# Patient Record
Sex: FEMALE | Race: WHITE | URBAN - METROPOLITAN AREA
[De-identification: names, ages, dates, MRNs, and addresses within clinical notes are randomized per-mention and may not be internally consistent; named-entity substitution may affect disease eponyms.]

---

## 2020-02-13 ENCOUNTER — HOSPITAL ENCOUNTER (OUTPATIENT)
Age: 45
Setting detail: SPECIMEN
Discharge: HOME | End: 2020-02-13

## 2020-02-13 LAB
DIRECT EXAM: NORMAL
Lab: NORMAL
SPECIMEN DESCRIPTION: NORMAL

## 2020-02-16 LAB
CHLAMYDIA BY THIN PREP: NEGATIVE
N. GONORRHOEAE DNA, THIN PREP: NEGATIVE
SPECIMEN DESCRIPTION: NORMAL

## 2020-02-18 LAB
HPV SAMPLE: NORMAL
HPV, GENOTYPE 16: NOT DETECTED
HPV, GENOTYPE 18: NOT DETECTED
HPV, HIGH RISK OTHER: NOT DETECTED
HPV, INTERPRETATION: NORMAL
SPECIMEN DESCRIPTION: NORMAL

## 2020-02-21 LAB — CYTOLOGY REPORT: NORMAL

## 2020-07-16 ENCOUNTER — HOSPITAL ENCOUNTER (OUTPATIENT)
Age: 45
Setting detail: SPECIMEN
Discharge: HOME | End: 2020-07-16
Payer: MEDICAID

## 2020-07-18 LAB
CULTURE: ABNORMAL
CULTURE: ABNORMAL
Lab: ABNORMAL
SPECIMEN DESCRIPTION: ABNORMAL

## 2020-10-14 ENCOUNTER — HOSPITAL ENCOUNTER (OUTPATIENT)
Age: 45
Setting detail: SPECIMEN
Discharge: HOME | End: 2020-10-14
Payer: MEDICAID

## 2020-10-15 LAB
ABSOLUTE EOS #: 0.17 K/UL (ref 0–0.44)
ABSOLUTE IMMATURE GRANULOCYTE: 0.06 K/UL (ref 0–0.3)
ABSOLUTE LYMPH #: 2.4 K/UL (ref 1.1–3.7)
ABSOLUTE MONO #: 0.53 K/UL (ref 0.1–1.2)
ALBUMIN SERPL-MCNC: 4.4 G/DL (ref 3.5–5.2)
ALBUMIN/GLOBULIN RATIO: 1.6 (ref 1–2.5)
ALP BLD-CCNC: 79 U/L (ref 35–104)
ALT SERPL-CCNC: 14 U/L (ref 5–33)
ANION GAP SERPL CALCULATED.3IONS-SCNC: 13 MMOL/L (ref 9–17)
AST SERPL-CCNC: 18 U/L
BASOPHILS # BLD: 1 % (ref 0–2)
BASOPHILS ABSOLUTE: 0.07 K/UL (ref 0–0.2)
BILIRUB SERPL-MCNC: 0.5 MG/DL (ref 0.3–1.2)
BUN BLDV-MCNC: 12 MG/DL (ref 6–20)
BUN/CREAT BLD: ABNORMAL (ref 9–20)
CALCIUM SERPL-MCNC: 9.4 MG/DL (ref 8.6–10.4)
CHLORIDE BLD-SCNC: 101 MMOL/L (ref 98–107)
CHOLESTEROL/HDL RATIO: 4.5
CHOLESTEROL: 218 MG/DL
CO2: 25 MMOL/L (ref 20–31)
CREAT SERPL-MCNC: 0.93 MG/DL (ref 0.5–0.9)
DIFFERENTIAL TYPE: ABNORMAL
EOSINOPHILS RELATIVE PERCENT: 2 % (ref 1–4)
GFR AFRICAN AMERICAN: >60 ML/MIN
GFR NON-AFRICAN AMERICAN: >60 ML/MIN
GFR SERPL CREATININE-BSD FRML MDRD: ABNORMAL ML/MIN/{1.73_M2}
GFR SERPL CREATININE-BSD FRML MDRD: ABNORMAL ML/MIN/{1.73_M2}
GLUCOSE BLD-MCNC: 95 MG/DL (ref 70–99)
HCT VFR BLD CALC: 45 % (ref 36.3–47.1)
HDLC SERPL-MCNC: 48 MG/DL
HEMOGLOBIN: 14.4 G/DL (ref 11.9–15.1)
IMMATURE GRANULOCYTES: 1 %
LDL CHOLESTEROL: 144 MG/DL (ref 0–130)
LYMPHOCYTES # BLD: 30 % (ref 24–43)
MCH RBC QN AUTO: 30.6 PG (ref 25.2–33.5)
MCHC RBC AUTO-ENTMCNC: 32 G/DL (ref 28.4–34.8)
MCV RBC AUTO: 95.5 FL (ref 82.6–102.9)
MONOCYTES # BLD: 7 % (ref 3–12)
NRBC AUTOMATED: 0 PER 100 WBC
PDW BLD-RTO: 13.4 % (ref 11.8–14.4)
PLATELET # BLD: 266 K/UL (ref 138–453)
PLATELET ESTIMATE: ABNORMAL
PMV BLD AUTO: 12.4 FL (ref 8.1–13.5)
POTASSIUM SERPL-SCNC: 3.6 MMOL/L (ref 3.7–5.3)
RBC # BLD: 4.71 M/UL (ref 3.95–5.11)
RBC # BLD: ABNORMAL 10*6/UL
SEG NEUTROPHILS: 59 % (ref 36–65)
SEGMENTED NEUTROPHILS ABSOLUTE COUNT: 4.66 K/UL (ref 1.5–8.1)
SODIUM BLD-SCNC: 139 MMOL/L (ref 135–144)
TOTAL PROTEIN: 7.1 G/DL (ref 6.4–8.3)
TRIGL SERPL-MCNC: 129 MG/DL
TSH SERPL DL<=0.05 MIU/L-ACNC: 0.98 MIU/L (ref 0.3–5)
VITAMIN D 25-HYDROXY: 10 NG/ML (ref 30–100)
VLDLC SERPL CALC-MCNC: ABNORMAL MG/DL (ref 1–30)
WBC # BLD: 7.9 K/UL (ref 3.5–11.3)
WBC # BLD: ABNORMAL 10*3/UL

## 2021-07-10 ENCOUNTER — HOSPITAL ENCOUNTER (EMERGENCY)
Age: 46
Discharge: HOME | End: 2021-07-10
Attending: EMERGENCY MEDICINE

## 2021-07-10 VITALS
DIASTOLIC BLOOD PRESSURE: 82 MMHG | HEART RATE: 81 BPM | SYSTOLIC BLOOD PRESSURE: 133 MMHG | OXYGEN SATURATION: 98 % | TEMPERATURE: 97.9 F | RESPIRATION RATE: 18 BRPM
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
## 2021-07-10 PROCEDURE — 99284 EMERGENCY DEPT VISIT MOD MDM: CPT

## 2021-07-10 PROCEDURE — 6360000002 HC RX W HCPCS

## 2021-07-10 PROCEDURE — 6370000000 HC RX 637 (ALT 250 FOR IP): Performed by: STUDENT IN AN ORGANIZED HEALTH CARE EDUCATION/TRAINING PROGRAM

## 2021-07-10 PROCEDURE — 90471 IMMUNIZATION ADMIN: CPT

## 2021-07-10 PROCEDURE — 2500000003 HC RX 250 WO HCPCS

## 2021-07-10 PROCEDURE — 12001 RPR S/N/AX/GEN/TRNK 2.5CM/<: CPT

## 2021-07-10 PROCEDURE — 90715 TDAP VACCINE 7 YRS/> IM: CPT

## 2021-07-10 RX ORDER — HYDROCODONE BITARTRATE AND ACETAMINOPHEN 5; 325 MG/1; MG/1
1 TABLET ORAL ONCE
Status: COMPLETED | OUTPATIENT
Start: 2021-07-10 | End: 2021-07-10

## 2021-07-10 RX ORDER — IBUPROFEN 600 MG/1
600 TABLET ORAL 4 TIMES DAILY PRN
Qty: 15 TABLET | Refills: 0 | Status: SHIPPED | OUTPATIENT
Start: 2021-07-10 | End: 2021-07-10 | Stop reason: SDUPTHER

## 2021-07-10 RX ORDER — LIDOCAINE HYDROCHLORIDE 10 MG/ML
INJECTION, SOLUTION INFILTRATION; PERINEURAL
Status: COMPLETED
Start: 2021-07-10 | End: 2021-07-10

## 2021-07-10 RX ORDER — SULFAMETHOXAZOLE AND TRIMETHOPRIM 800; 160 MG/1; MG/1
1 TABLET ORAL 2 TIMES DAILY
Qty: 20 TABLET | Refills: 0 | Status: SHIPPED | OUTPATIENT
Start: 2021-07-10 | End: 2021-07-20

## 2021-07-10 RX ORDER — BROMPHENIRAMINE MALEATE, PSEUDOEPHEDRINE HYDROCHLORIDE, AND DEXTROMETHORPHAN HYDROBROMIDE 2; 30; 10 MG/5ML; MG/5ML; MG/5ML
SYRUP ORAL
COMMUNITY
Start: 2021-04-26

## 2021-07-10 RX ORDER — ACETAMINOPHEN 500 MG
TABLET ORAL
Qty: 15 TABLET | Refills: 0 | Status: SHIPPED | OUTPATIENT
Start: 2021-07-10 | End: 2021-07-10 | Stop reason: SDUPTHER

## 2021-07-10 RX ORDER — IBUPROFEN 600 MG/1
600 TABLET ORAL 4 TIMES DAILY PRN
Qty: 15 TABLET | Refills: 0 | Status: SHIPPED | OUTPATIENT
Start: 2021-07-10

## 2021-07-10 RX ORDER — CLINDAMYCIN HYDROCHLORIDE 150 MG/1
450 CAPSULE ORAL 3 TIMES DAILY
Qty: 90 CAPSULE | Refills: 0 | Status: SHIPPED | OUTPATIENT
Start: 2021-07-10 | End: 2021-07-20

## 2021-07-10 RX ORDER — LIDOCAINE HYDROCHLORIDE 10 MG/ML
20 INJECTION, SOLUTION INFILTRATION; PERINEURAL ONCE
Status: COMPLETED | OUTPATIENT
Start: 2021-07-10 | End: 2021-07-10

## 2021-07-10 RX ORDER — ACETAMINOPHEN 500 MG
TABLET ORAL
Qty: 15 TABLET | Refills: 0 | Status: SHIPPED | OUTPATIENT
Start: 2021-07-10

## 2021-07-10 RX ADMIN — LIDOCAINE HYDROCHLORIDE 20 ML: 10 INJECTION, SOLUTION INFILTRATION; PERINEURAL at 15:23

## 2021-07-10 RX ADMIN — TETANUS TOXOID, REDUCED DIPHTHERIA TOXOID AND ACELLULAR PERTUSSIS VACCINE, ADSORBED 0.5 ML: 5; 2.5; 8; 8; 2.5 SUSPENSION INTRAMUSCULAR at 14:28

## 2021-07-10 RX ADMIN — HYDROCODONE BITARTRATE AND ACETAMINOPHEN 1 TABLET: 5; 325 TABLET ORAL at 14:25

## 2021-07-10 ASSESSMENT — ENCOUNTER SYMPTOMS
NAUSEA: 0
VOMITING: 0
CONSTIPATION: 0
DIARRHEA: 0
ABDOMINAL PAIN: 0
COUGH: 0
SHORTNESS OF BREATH: 0

## 2021-07-10 ASSESSMENT — PAIN - FUNCTIONAL ASSESSMENT: PAIN_FUNCTIONAL_ASSESSMENT: PREVENTS OR INTERFERES SOME ACTIVE ACTIVITIES AND ADLS

## 2021-07-10 ASSESSMENT — PAIN DESCRIPTION - PAIN TYPE: TYPE: ACUTE PAIN

## 2021-07-10 ASSESSMENT — PAIN DESCRIPTION - PROGRESSION: CLINICAL_PROGRESSION: GRADUALLY WORSENING

## 2021-07-10 ASSESSMENT — PAIN SCALES - GENERAL: PAINLEVEL_OUTOF10: 10

## 2021-07-10 ASSESSMENT — PAIN DESCRIPTION - FREQUENCY: FREQUENCY: INTERMITTENT

## 2021-07-10 ASSESSMENT — PAIN DESCRIPTION - DESCRIPTORS: DESCRIPTORS: SHARP;SHOOTING

## 2021-07-10 ASSESSMENT — PAIN DESCRIPTION - LOCATION: LOCATION: FINGER (COMMENT WHICH ONE);HAND

## 2021-07-10 ASSESSMENT — PAIN DESCRIPTION - ORIENTATION: ORIENTATION: RIGHT

## 2021-07-10 ASSESSMENT — PAIN DESCRIPTION - ONSET: ONSET: SUDDEN

## 2021-07-10 NOTE — ED NOTES
Patient sitting up on side of stretcher  No new complaints  Waiting for wound care     Tanmay Griffith RN  07/10/21 1640

## 2021-07-10 NOTE — ED PROVIDER NOTES
[facility] ED     Emergency Department     Faculty Attestation    I performed a history and physical examination of the patient and discussed management with the resident. I reviewed the residents note and agree with the documented findings and plan of care. Any areas of disagreement are noted on the chart. I was personally present for the key portions of any procedures. I have documented in the chart those procedures where I was not present during the key portions. I have reviewed the emergency nurses triage note. I agree with the chief complaint, past medical history, past surgical history, allergies, medications, social and family history as documented unless otherwise noted below. For Physician Assistant/ Nurse Practitioner cases/documentation I have personally evaluated this patient and have completed at least one if not all key elements of the E/M (history, physical exam, and MDM). Additional findings are as noted. This patient was evaluated in the Emergency Department for symptoms described in the history of present illness. He/she was evaluated in the context of the global COVID-19 pandemic, which necessitated consideration that the patient might be at risk for infection with the SARS-CoV-2 virus that causes COVID-19. Institutional protocols and algorithms that pertain to the evaluation of patients at risk for COVID-19 are in a state of rapid change based on information released by regulatory bodies including the CDC and federal and state organizations. These policies and algorithms were followed during the patient's care in the ED. Patient attacked by dog bite to the right hand. Tetanus is not up-to-date. States that the dog did have tags on. Laceration to the right index finger on the volar aspect gaping will require loose closure. Will remove ring as well given edema the finger.       Critical Care     none    Shiva Everett MD, [facility]  Attending Emergency Physician             Veronica Day, MD  07/10/21 0443

## 2021-07-10 NOTE — ED NOTES
Dr. Brian Mcgee @ bedside to suture patient's lacerated fingers.       Claudene Sportsman, RN  07/10/21 0037

## 2021-07-10 NOTE — ED PROVIDER NOTES
[facility]  ED  Emergency Department Encounter  Emergency Medicine Resident     Pt Name: Destini Ryan  [facility]:[id number]  Luizgfpardeep 1975  Date of evaluation: 7/10/21  PCP:  Lia Chester    CHIEF COMPLAINT       Chief Complaint   Patient presents with    Animal Bite     laceration to R fingers      HISTORY OF PRESENT ILLNESS  (Location/Symptom, Timing/Onset, Context/Setting, Quality, Duration, ModifyingFactors, Severity.)      Destini Ryan is a 55 y.o. female who presents for evaluation of dog bite to right hand. Prior to arrival patient was walking when a stray dog bit her. Unknown last tetanus. Complaining of severe pain to right hand. No numbness, tingling, weakness. Patient did not fall, did not hit her head, no other injuries. Not on anticoagulation. Patient is allergies to penicillin and erythromycin-hives with both. PAST MEDICAL / SURGICAL / SOCIAL /FAMILY HISTORY      has a past medical history of Anxiety and Hypertension. No other pertinent PMH on review with patient/guardian. has a past surgical history that includes Tubal ligation. No other pertinent PSH on review with patient/guardian. Social History     Socioeconomic History    Marital status:      Spouse name: Not on file    Number of children: Not on file    Years of education: Not on file    Highest education level: Not on file   Occupational History    Not on file   Tobacco Use    Smoking status: Current Every Day Smoker     Types: Cigarettes    Smokeless tobacco: Never Used   Substance and Sexual Activity    Alcohol use:  Yes    Drug use: No    Sexual activity: Not on file   Other Topics Concern    Not on file   Social History Narrative    Not on file     Social Determinants of Health     Financial Resource Strain:     Difficulty of Paying Living Expenses:    Food Insecurity:     Worried About Running Out of Food in the Last Year:     [address] in the Last Year:    Transportation MD   traZODone (DESYREL) 50 MG tablet Take 50 mg by mouth nightly    Historical Provider, MD   ALPRAZolam (XANAX) 0.5 MG tablet Take 0.5 mg by mouth nightly as needed for Sleep    Historical Provider, MD       REVIEW OF SYSTEMS    (2-9 systems for level 4, 10 ormore for level 5)      Review of Systems   Constitutional: Negative for fever. Eyes: Negative for visual disturbance. Respiratory: Negative for cough and shortness of breath. Cardiovascular: Negative for chest pain. Gastrointestinal: Negative for abdominal pain, constipation, diarrhea, nausea and vomiting. Skin: Positive for wound (Dog bite right hand). Negative for rash. Allergic/Immunologic: Negative for immunocompromised state. Neurological: Negative for weakness, numbness and headaches. Hematological: Does not bruise/bleed easily. PHYSICAL EXAM   (up to 7 for level 4, 8 or more for level 5)      INITIAL VITALS:   /82   Pulse 81   Temp 97.9 °F (36.6 °C) (Oral)   Resp 18   SpO2 98%     Physical Exam  Constitutional:       General: She is not in acute distress. Appearance: Normal appearance. HENT:      Head: Normocephalic and atraumatic. Right Ear: External ear normal.      Left Ear: External ear normal.   Eyes:      General:         Right eye: No discharge. Left eye: No discharge. Cardiovascular:      Rate and Rhythm: Normal rate. Pulses: Normal pulses. Pulmonary:      Effort: Pulmonary effort is normal. No respiratory distress. Musculoskeletal:      Comments: Multiple puncture wounds to right middle finger including 2.5 cm laceration to volar proximal middle finger. Abrasions to index/ring finger. See photo for details. Full ROM with flexion/extension of all digits. Sensation intact. Cap refill brisk. Radial pulse 2+   Skin:     Capillary Refill: Capillary refill takes less than 2 seconds. Neurological:      General: No focal deficit present. Mental Status: She is alert. DIFFERENTIAL  DIAGNOSIS     PLAN (LABS / IMAGING / EKG):  No orders of the defined types were placed in this encounter. MEDICATIONS ORDERED:  Orders Placed This Encounter   Medications    Tetanus-Diphth-Acell Pertussis (BOOSTRIX) injection 0.5 mL    HYDROcodone-acetaminophen (NORCO) 5-325 MG per tablet 1 tablet    Tetanus-Diphth-Acell Pertussis (BOOSTRIX) 5-2.5-18.5 LF-MCG/0.5 injection     Gwenette Pancoast: cabinet override    lidocaine 1 % injection 20 mL    lidocaine 1 % injection     Gwenette Pancoast: cabinet override    clindamycin (CLEOCIN) 150 MG capsule     Sig: Take 3 capsules by mouth 3 times daily for 10 days     Dispense:  90 capsule     Refill:  0    sulfamethoxazole-trimethoprim (BACTRIM DS) 800-160 MG per tablet     Sig: Take 1 tablet by mouth 2 times daily for 10 days     Dispense:  20 tablet     Refill:  0    DISCONTD: ibuprofen (ADVIL;MOTRIN) 600 MG tablet     Sig: Take 1 tablet by mouth 4 times daily as needed for Pain     Dispense:  15 tablet     Refill:  0    DISCONTD: acetaminophen (TYLENOL) 500 MG tablet     Sig: Take 1-2 tabs every 6 hours as needed for pain. Dispense:  15 tablet     Refill:  0    acetaminophen (TYLENOL) 500 MG tablet     Sig: Take 1-2 tabs every 6 hours as needed for pain. Dispense:  15 tablet     Refill:  0    ibuprofen (ADVIL;MOTRIN) 600 MG tablet     Sig: Take 1 tablet by mouth 4 times daily as needed for Pain     Dispense:  15 tablet     Refill:  0       DIAGNOSTIC RESULTS / EMERGENCY DEPARTMENT COURSE / MDM     LABS:  No results found for this visit on 07/10/21. IMPRESSION/MDM/ED COURSE:  55 y.o. female presented with dog bite to right hand prior to arrival.  Patient afebrile vitals WNL. On exam patient appears uncomfortable but nontoxic-appearing. Multiple puncture wounds to right middle finger with 2.5 cm laceration of volar middle finger. Ring to middle finger was cut off using trauma alexandre. Tetanus updated.   Laceration was closed with sutures, see procedure note below. Patient discharged with clindamycin and Bactrim due to penicillin allergy. Will have patient follow-up with her primary care provider early next week for wound recheck. Suture removal in 10 days. I discussed signs and symptoms that would require reevaluation in the ED. The patient expressed understanding and agreement with plan. All questions answered. Patient/Guardian requesting discharge. Patient/Guardian was given written and verbal instructions prior to discharge. Patient/Guardian understood and agreed. Patient/Guardian had no further questions. RADIOLOGY:  No orders to display       EKG  None    All EKG's are interpreted by the Emergency Department Physician who either signs or Co-signs this chart in the absence of a cardiologist.    PROCEDURES:  PROCEDURE NOTE - LACERATION CLOSURE    PATIENT NAME: Balbina Iglesias RECORD NO. [id number]  DATE: 7/10/2021  ATTENDING PHYSICIAN: Dr. Zeinab Heard DIAGNOSIS: Laceration(s) as follows:  -Location: R index finger  -Length: 2.5 cm  -Layered closure: No    POSTOPERATIVE DIAGNOSIS:  Same  PROCEDURE PERFORMED:  Suture closure of laceration  PERFORMING PHYSICIAN: Husam Hairston DO  ANESTHESIA:  Local utilizing  Lidocaine 1% without epinephrine  ESTIMATED BLOOD LOSS:  Less than 25 ml. DISCUSSION:  Belkis Marina is a 55y.o.-year-old female. Patient requires laceration repair. The history and physical examination were reviewed and confirmed. CONSENT: The patient provided verbal consent for this procedure. PROCEDURE:  Prior to starting, the procedure and patient were confirmed by those present. The wound area was irrigated with sterile saline and draped in a sterile fashion. The wound area was anesthetized with Lidocaine 1% without epinephrine. The wound was explored with the following results No foreign bodies found. A figure-of-eight with 3-0 Vicryl was used to control bleeding.   The wound was repaired with 3-0 Prolene using interrupted sutures. The wound was dressed with bacitracin and a sterile dressing. All sponge, instrument and needle counts were correct at the completion of the procedure. The patient tolerated the procedure well. SUTURE COUNT: Suture count: 2    COMPLICATIONS:  None     Rosa Winston DO  4:37 PM, 7/10/21          CONSULTS:  None    FINAL IMPRESSION      1. Dog bite, initial encounter          DISPOSITION / PLAN     DISPOSITION Decision To Discharge 07/10/2021 04:00:31 PM      PATIENT REFERREDTO:  Lia Chester  [address] [place] [address] Street  [phone]            DISCHARGE MEDICATIONS:  New Prescriptions    ACETAMINOPHEN (TYLENOL) 500 MG TABLET    Take 1-2 tabs every 6 hours as needed for pain.     CLINDAMYCIN (CLEOCIN) 150 MG CAPSULE    Take 3 capsules by mouth 3 times daily for 10 days    IBUPROFEN (ADVIL;MOTRIN) 600 MG TABLET    Take 1 tablet by mouth 4 times daily as needed for Pain    SULFAMETHOXAZOLE-TRIMETHOPRIM (BACTRIM DS) 800-160 MG PER TABLET    Take 1 tablet by mouth 2 times daily for 10 days       Nel Rush DO  PGY 2  Resident Physician Emergency Medicine  07/10/21 4:37 PM    (Please note that portions of this note were completed with a voice recognition program.Efforts were made to edit the dictations but occasionally words are mis-transcribed.)       Rosa Winston DO  Resident  07/10/21 Tammy

## 2021-07-10 NOTE — ED TRIAGE NOTES
Pt presents to ED from home c/o being bit in the R hand by a dog 20 mins PTA. Pt rates pain as 10/10 currently, and has lacerations on 3rd, 4th, and pinky finger of R hand. Pt lacerations are actively bleeding upon arrival. Pt is A&Ox4, restless on stretcher, but resp even and unlabored.

## 2024-05-15 ENCOUNTER — TELEPHONE (OUTPATIENT)
Dept: OBGYN | Age: 49
End: 2024-05-15

## 2024-05-15 NOTE — TELEPHONE ENCOUNTER
Patient was referred by [facility] for surgical consult. RN called patient to schedule. No answer, message left.

## 2024-05-16 ENCOUNTER — TRANSCRIBE ORDERS (OUTPATIENT)
Dept: ADMINISTRATIVE | Age: 49
End: 2024-05-16

## 2024-05-16 DIAGNOSIS — M89.8X0 OTHER SPECIFIED DISORDERS OF BONE, MULTIPLE SITES: Primary | ICD-10-CM

## 2024-05-16 DIAGNOSIS — Z01.419 ENCOUNTER FOR GYNECOLOGICAL EXAMINATION WITHOUT ABNORMAL FINDING: Primary | ICD-10-CM

## 2024-05-17 ENCOUNTER — HOSPITAL ENCOUNTER (OUTPATIENT)
Dept: ULTRASOUND IMAGING | Age: 49
End: 2024-05-17
Payer: MEDICAID

## 2024-05-17 DIAGNOSIS — M89.8X0 OTHER SPECIFIED DISORDERS OF BONE, MULTIPLE SITES: ICD-10-CM

## 2024-05-17 DIAGNOSIS — N89.8 OTHER SPECIFIED NONINFLAMMATORY DISORDERS OF VAGINA: ICD-10-CM

## 2024-05-17 PROCEDURE — 76856 US EXAM PELVIC COMPLETE: CPT

## 2024-05-17 PROCEDURE — 76830 TRANSVAGINAL US NON-OB: CPT

## 2024-06-06 ENCOUNTER — HOSPITAL ENCOUNTER (OUTPATIENT)
Age: 49
Setting detail: SPECIMEN
Discharge: HOME | End: 2024-06-06

## 2024-06-06 ENCOUNTER — PROCEDURE VISIT (OUTPATIENT)
Dept: OBGYN | Age: 49
End: 2024-06-06
Payer: MEDICAID

## 2024-06-06 VITALS
HEIGHT: 67 IN | SYSTOLIC BLOOD PRESSURE: 130 MMHG | BODY MASS INDEX: 45.99 KG/M2 | HEART RATE: 88 BPM | DIASTOLIC BLOOD PRESSURE: 81 MMHG | WEIGHT: 293 LBS

## 2024-06-06 DIAGNOSIS — N84.1 CERVICAL POLYP: Primary | ICD-10-CM

## 2024-06-06 DIAGNOSIS — N89.8 VAGINAL DISCHARGE: ICD-10-CM

## 2024-06-06 DIAGNOSIS — B37.9 YEAST INFECTION: ICD-10-CM

## 2024-06-06 DIAGNOSIS — Z01.818 PREOP TESTING: ICD-10-CM

## 2024-06-06 DIAGNOSIS — N39.46 MIXED INCONTINENCE URGE AND STRESS: ICD-10-CM

## 2024-06-06 PROCEDURE — 99203 OFFICE O/P NEW LOW 30 MIN: CPT | Performed by: STUDENT IN AN ORGANIZED HEALTH CARE EDUCATION/TRAINING PROGRAM

## 2024-06-06 PROCEDURE — 99211 OFF/OP EST MAY X REQ PHY/QHP: CPT | Performed by: STUDENT IN AN ORGANIZED HEALTH CARE EDUCATION/TRAINING PROGRAM

## 2024-06-06 RX ORDER — SOLIFENACIN SUCCINATE 5 MG/1
5 TABLET, FILM COATED ORAL DAILY
COMMUNITY

## 2024-06-06 RX ORDER — NYSTATIN 100000 [USP'U]/G
POWDER TOPICAL
Qty: 30 G | Refills: 0 | Status: SHIPPED | OUTPATIENT
Start: 2024-06-06

## 2024-06-06 RX ORDER — BUSPIRONE HYDROCHLORIDE 10 MG/1
10 TABLET ORAL 3 TIMES DAILY
COMMUNITY

## 2024-06-06 RX ORDER — AZELASTINE HYDROCHLORIDE, FLUTICASONE PROPIONATE 137; 50 UG/1; UG/1
1 SPRAY, METERED NASAL PRN
COMMUNITY
Start: 2022-12-06

## 2024-06-06 RX ORDER — ATENOLOL AND CHLORTHALIDONE TABLET 50; 25 MG/1; MG/1
1 TABLET ORAL DAILY
COMMUNITY
Start: 2024-03-07

## 2024-06-06 RX ORDER — VENLAFAXINE HYDROCHLORIDE 75 MG/1
75 CAPSULE, EXTENDED RELEASE ORAL DAILY
COMMUNITY

## 2024-06-06 ASSESSMENT — PATIENT HEALTH QUESTIONNAIRE - PHQ9
SUM OF ALL RESPONSES TO PHQ QUESTIONS 1-9: 1
SUM OF ALL RESPONSES TO PHQ QUESTIONS 1-9: 1
1. LITTLE INTEREST OR PLEASURE IN DOING THINGS: NOT AT ALL
SUM OF ALL RESPONSES TO PHQ9 QUESTIONS 1 & 2: 1
2. FEELING DOWN, DEPRESSED OR HOPELESS: SEVERAL DAYS
SUM OF ALL RESPONSES TO PHQ QUESTIONS 1-9: 1
SUM OF ALL RESPONSES TO PHQ QUESTIONS 1-9: 1

## 2024-06-06 NOTE — PROGRESS NOTES
OB/GYN Problem Visit    Nata Myles  6/6/2024                       Primary Care Physician: Mindi Hamlin APRN - NP    CC:   Chief Complaint   Patient presents with    Established New Doctor     Referral from [facility] for surgical consult         HPI: Nata Myles is a 49 y.o. female     The patient was seen and examined. She is here for a consult from her primary care doctor and is complaining of bleeding with intercourse. Patient reports for the past several months she has been having bleeding \" like a period\" with intercourse with her boyfriend. She denies pain with this. She reports no other vaginal bleeding and has not had a period since 2022. She is now considered menopausal.     She is also complaining of incontinence of urine with cough/laugh and if she does any strenuous activity. She also reports that when she feels the urge to go, she often doesn't make it to the restroom in time.      Her bowel habits are regular. She denies any bloating.  She denies dysuria. She complains of urinary leaking.  She denies vaginal discharge.  She is sexually active with single partner, contraception - none.     REVIEW OF SYSTEMS:   Constitutional: negative fever, negative chills, negative weight changes   HEENT: negative visual disturbances, negative headaches, negative dizziness  Breast: negative breast abnormalities, negative breast lumps, negative nipple discharge  Respiratory: negative dyspnea, negative cough, negative SOB  Cardiovascular: negative chest pain,  negative palpitations, negative arrhythmia, negative syncope   Gastrointestinal: negative abdominal pain, negative RUQ pain, negative N/V, negative diarrhea, negative constipation, negative bowel changes  Genitourinary: negative dysuria, negative hematuria, + urinary incontinence, negative vaginal discharge, + vaginal bleeding  Dermatological: negative rash, negative pruritis, negative mole or other skin changes  Hematologic: negative bruising, negative

## 2024-06-07 DIAGNOSIS — N89.8 VAGINAL DISCHARGE: ICD-10-CM

## 2024-06-07 LAB
CANDIDA SPECIES: NEGATIVE
GARDNERELLA VAGINALIS: NEGATIVE
SOURCE: NORMAL
TRICHOMONAS: NEGATIVE

## 2024-06-12 ENCOUNTER — TELEPHONE (OUTPATIENT)
Dept: OBGYN | Age: 49
End: 2024-06-12

## 2024-06-12 NOTE — TELEPHONE ENCOUNTER
Patient was called regarding surgical appointment dates.  She was informed of the place, date, arrival time, and time of surgery. She was also told not to eat or drink anything after midnight the evening before her surgery.    The patient's surgery was discussed at her appointment.  Questions were answered and patient was encouraged to contact the office if she had any other questions.  Written material was provided. Patient voiced understanding of the above.

## 2024-07-05 ENCOUNTER — HOSPITAL ENCOUNTER (OUTPATIENT)
Age: 49
Setting detail: SPECIMEN
Discharge: HOME | End: 2024-07-05

## 2024-07-05 DIAGNOSIS — Z01.818 PREOP TESTING: ICD-10-CM

## 2024-07-05 LAB
BASOPHILS # BLD: 0.08 K/UL (ref 0–0.2)
BASOPHILS NFR BLD: 1 % (ref 0–2)
EOSINOPHIL # BLD: 0.14 K/UL (ref 0–0.44)
EOSINOPHILS RELATIVE PERCENT: 2 % (ref 1–4)
ERYTHROCYTE [DISTWIDTH] IN BLOOD BY AUTOMATED COUNT: 13.5 % (ref 11.8–14.4)
HCT VFR BLD AUTO: 44.3 % (ref 36.3–47.1)
HGB BLD-MCNC: 13.8 G/DL (ref 11.9–15.1)
IMM GRANULOCYTES # BLD AUTO: 0.05 K/UL (ref 0–0.3)
IMM GRANULOCYTES NFR BLD: 1 %
LYMPHOCYTES NFR BLD: 2.47 K/UL (ref 1.1–3.7)
LYMPHOCYTES RELATIVE PERCENT: 35 % (ref 24–43)
MCH RBC QN AUTO: 29.2 PG (ref 25.2–33.5)
MCHC RBC AUTO-ENTMCNC: 31.2 G/DL (ref 28.4–34.8)
MCV RBC AUTO: 93.9 FL (ref 82.6–102.9)
MONOCYTES NFR BLD: 0.56 K/UL (ref 0.1–1.2)
MONOCYTES NFR BLD: 8 % (ref 3–12)
NEUTROPHILS NFR BLD: 53 % (ref 36–65)
NEUTS SEG NFR BLD: 3.78 K/UL (ref 1.5–8.1)
NRBC BLD-RTO: 0 PER 100 WBC
PLATELET # BLD AUTO: 292 K/UL (ref 138–453)
PMV BLD AUTO: 12 FL (ref 8.1–13.5)
RBC # BLD AUTO: 4.72 M/UL (ref 3.95–5.11)
WBC OTHER # BLD: 7.1 K/UL (ref 3.5–11.3)

## 2024-07-06 PROBLEM — Z01.818 PREOP TESTING: Status: RESOLVED | Noted: 2024-06-06 | Resolved: 2024-07-06

## 2024-07-06 LAB
EST. AVERAGE GLUCOSE BLD GHB EST-MCNC: 123 MG/DL
HBA1C MFR BLD: 5.9 % (ref 4–6)

## 2024-07-08 ENCOUNTER — ANESTHESIA (OUTPATIENT)
Dept: OPERATING ROOM | Age: 49
End: 2024-07-08
Payer: MEDICAID

## 2024-07-08 ENCOUNTER — ANESTHESIA EVENT (OUTPATIENT)
Dept: OPERATING ROOM | Age: 49
End: 2024-07-08
Payer: MEDICAID

## 2024-07-08 ENCOUNTER — HOSPITAL ENCOUNTER (OUTPATIENT)
Age: 49
Setting detail: OUTPATIENT SURGERY
Discharge: HOME | End: 2024-07-08
Attending: OBSTETRICS & GYNECOLOGY | Admitting: OBSTETRICS & GYNECOLOGY
Payer: MEDICAID

## 2024-07-08 VITALS
BODY MASS INDEX: 45.99 KG/M2 | HEART RATE: 84 BPM | OXYGEN SATURATION: 94 % | WEIGHT: 293 LBS | RESPIRATION RATE: 16 BRPM | TEMPERATURE: 97.5 F | DIASTOLIC BLOOD PRESSURE: 68 MMHG | HEIGHT: 67 IN | SYSTOLIC BLOOD PRESSURE: 108 MMHG

## 2024-07-08 DIAGNOSIS — N84.1 CERVICAL POLYP: ICD-10-CM

## 2024-07-08 PROBLEM — N93.9 ABNORMAL UTERINE BLEEDING DUE TO ENDOMETRIAL POLYP: Status: ACTIVE | Noted: 2024-07-08

## 2024-07-08 PROBLEM — Z98.890 STATUS POST HYSTEROSCOPY: Status: ACTIVE | Noted: 2024-07-08

## 2024-07-08 PROBLEM — N84.0 ABNORMAL UTERINE BLEEDING DUE TO ENDOMETRIAL POLYP: Status: ACTIVE | Noted: 2024-07-08

## 2024-07-08 LAB
BUN BLD-MCNC: 15 MG/DL (ref 8–26)
EGFR, POC: >90 ML/MIN/1.73M2
GLUCOSE BLD-MCNC: 158 MG/DL (ref 74–100)
HCG, PREGNANCY URINE (POC): NEGATIVE
POC CREATININE: 0.8 MG/DL (ref 0.51–1.19)

## 2024-07-08 PROCEDURE — 82565 ASSAY OF CREATININE: CPT

## 2024-07-08 PROCEDURE — 3700000001 HC ADD 15 MINUTES (ANESTHESIA): Performed by: OBSTETRICS & GYNECOLOGY

## 2024-07-08 PROCEDURE — 88305 TISSUE EXAM BY PATHOLOGIST: CPT

## 2024-07-08 PROCEDURE — 2580000003 HC RX 258: Performed by: OBSTETRICS & GYNECOLOGY

## 2024-07-08 PROCEDURE — 7100000001 HC PACU RECOVERY - ADDTL 15 MIN: Performed by: OBSTETRICS & GYNECOLOGY

## 2024-07-08 PROCEDURE — 2500000003 HC RX 250 WO HCPCS

## 2024-07-08 PROCEDURE — 6360000002 HC RX W HCPCS: Performed by: ANESTHESIOLOGY

## 2024-07-08 PROCEDURE — 7100000011 HC PHASE II RECOVERY - ADDTL 15 MIN: Performed by: OBSTETRICS & GYNECOLOGY

## 2024-07-08 PROCEDURE — 3600000013 HC SURGERY LEVEL 3 ADDTL 15MIN: Performed by: OBSTETRICS & GYNECOLOGY

## 2024-07-08 PROCEDURE — 7100000000 HC PACU RECOVERY - FIRST 15 MIN: Performed by: OBSTETRICS & GYNECOLOGY

## 2024-07-08 PROCEDURE — 6370000000 HC RX 637 (ALT 250 FOR IP)

## 2024-07-08 PROCEDURE — 6360000002 HC RX W HCPCS

## 2024-07-08 PROCEDURE — 2720000010 HC SURG SUPPLY STERILE: Performed by: OBSTETRICS & GYNECOLOGY

## 2024-07-08 PROCEDURE — 2580000003 HC RX 258: Performed by: ANESTHESIOLOGY

## 2024-07-08 PROCEDURE — 3600000003 HC SURGERY LEVEL 3 BASE: Performed by: OBSTETRICS & GYNECOLOGY

## 2024-07-08 PROCEDURE — 81025 URINE PREGNANCY TEST: CPT

## 2024-07-08 PROCEDURE — 6370000000 HC RX 637 (ALT 250 FOR IP): Performed by: ANESTHESIOLOGY

## 2024-07-08 PROCEDURE — 3700000000 HC ANESTHESIA ATTENDED CARE: Performed by: OBSTETRICS & GYNECOLOGY

## 2024-07-08 PROCEDURE — 84520 ASSAY OF UREA NITROGEN: CPT

## 2024-07-08 PROCEDURE — 82947 ASSAY GLUCOSE BLOOD QUANT: CPT

## 2024-07-08 PROCEDURE — 7100000010 HC PHASE II RECOVERY - FIRST 15 MIN: Performed by: OBSTETRICS & GYNECOLOGY

## 2024-07-08 PROCEDURE — 2709999900 HC NON-CHARGEABLE SUPPLY: Performed by: OBSTETRICS & GYNECOLOGY

## 2024-07-08 RX ORDER — METOCLOPRAMIDE HYDROCHLORIDE 5 MG/ML
10 INJECTION INTRAMUSCULAR; INTRAVENOUS
Status: DISCONTINUED | OUTPATIENT
Start: 2024-07-08 | End: 2024-07-08 | Stop reason: HOSPADM

## 2024-07-08 RX ORDER — DEXAMETHASONE SODIUM PHOSPHATE 10 MG/ML
INJECTION, SOLUTION INTRAMUSCULAR; INTRAVENOUS PRN
Status: DISCONTINUED | OUTPATIENT
Start: 2024-07-08 | End: 2024-07-08 | Stop reason: SDUPTHER

## 2024-07-08 RX ORDER — SODIUM CHLORIDE 9 MG/ML
INJECTION, SOLUTION INTRAVENOUS PRN
Status: DISCONTINUED | OUTPATIENT
Start: 2024-07-08 | End: 2024-07-08 | Stop reason: HOSPADM

## 2024-07-08 RX ORDER — MAGNESIUM HYDROXIDE 1200 MG/15ML
LIQUID ORAL CONTINUOUS PRN
Status: DISCONTINUED | OUTPATIENT
Start: 2024-07-08 | End: 2024-07-08 | Stop reason: HOSPADM

## 2024-07-08 RX ORDER — ACETAMINOPHEN 500 MG
1000 TABLET ORAL EVERY 6 HOURS PRN
Qty: 480 TABLET | Refills: 0 | Status: SHIPPED | OUTPATIENT
Start: 2024-07-08 | End: 2024-09-06

## 2024-07-08 RX ORDER — PROPOFOL 10 MG/ML
INJECTION, EMULSION INTRAVENOUS PRN
Status: DISCONTINUED | OUTPATIENT
Start: 2024-07-08 | End: 2024-07-08 | Stop reason: SDUPTHER

## 2024-07-08 RX ORDER — HYDRALAZINE HYDROCHLORIDE 20 MG/ML
10 INJECTION INTRAMUSCULAR; INTRAVENOUS
Status: DISCONTINUED | OUTPATIENT
Start: 2024-07-08 | End: 2024-07-08 | Stop reason: HOSPADM

## 2024-07-08 RX ORDER — SODIUM CHLORIDE 0.9 % (FLUSH) 0.9 %
5-40 SYRINGE (ML) INJECTION EVERY 12 HOURS SCHEDULED
Status: DISCONTINUED | OUTPATIENT
Start: 2024-07-08 | End: 2024-07-08 | Stop reason: HOSPADM

## 2024-07-08 RX ORDER — SCOLOPAMINE TRANSDERMAL SYSTEM 1 MG/1
1 PATCH, EXTENDED RELEASE TRANSDERMAL ONCE
Status: DISCONTINUED | OUTPATIENT
Start: 2024-07-08 | End: 2024-07-08 | Stop reason: HOSPADM

## 2024-07-08 RX ORDER — ONDANSETRON 4 MG/1
4 TABLET, FILM COATED ORAL DAILY PRN
Qty: 30 TABLET | Refills: 0 | Status: SHIPPED | OUTPATIENT
Start: 2024-07-08

## 2024-07-08 RX ORDER — DIPHENHYDRAMINE HYDROCHLORIDE 50 MG/ML
25 INJECTION INTRAMUSCULAR; INTRAVENOUS ONCE
Status: COMPLETED | OUTPATIENT
Start: 2024-07-08 | End: 2024-07-08

## 2024-07-08 RX ORDER — SODIUM CHLORIDE, SODIUM LACTATE, POTASSIUM CHLORIDE, CALCIUM CHLORIDE 600; 310; 30; 20 MG/100ML; MG/100ML; MG/100ML; MG/100ML
INJECTION, SOLUTION INTRAVENOUS CONTINUOUS
Status: DISCONTINUED | OUTPATIENT
Start: 2024-07-08 | End: 2024-07-08 | Stop reason: HOSPADM

## 2024-07-08 RX ORDER — ALBUTEROL SULFATE 90 UG/1
AEROSOL, METERED RESPIRATORY (INHALATION) PRN
Status: DISCONTINUED | OUTPATIENT
Start: 2024-07-08 | End: 2024-07-08 | Stop reason: SDUPTHER

## 2024-07-08 RX ORDER — ONDANSETRON 2 MG/ML
INJECTION INTRAMUSCULAR; INTRAVENOUS PRN
Status: DISCONTINUED | OUTPATIENT
Start: 2024-07-08 | End: 2024-07-08 | Stop reason: SDUPTHER

## 2024-07-08 RX ORDER — LIDOCAINE HYDROCHLORIDE 10 MG/ML
INJECTION, SOLUTION EPIDURAL; INFILTRATION; INTRACAUDAL; PERINEURAL PRN
Status: DISCONTINUED | OUTPATIENT
Start: 2024-07-08 | End: 2024-07-08 | Stop reason: SDUPTHER

## 2024-07-08 RX ORDER — SODIUM CHLORIDE 0.9 % (FLUSH) 0.9 %
5-40 SYRINGE (ML) INJECTION PRN
Status: DISCONTINUED | OUTPATIENT
Start: 2024-07-08 | End: 2024-07-08 | Stop reason: HOSPADM

## 2024-07-08 RX ORDER — NALOXONE HYDROCHLORIDE 0.4 MG/ML
INJECTION, SOLUTION INTRAMUSCULAR; INTRAVENOUS; SUBCUTANEOUS PRN
Status: DISCONTINUED | OUTPATIENT
Start: 2024-07-08 | End: 2024-07-08 | Stop reason: HOSPADM

## 2024-07-08 RX ORDER — DROPERIDOL 2.5 MG/ML
0.62 INJECTION, SOLUTION INTRAMUSCULAR; INTRAVENOUS
Status: DISCONTINUED | OUTPATIENT
Start: 2024-07-08 | End: 2024-07-08 | Stop reason: HOSPADM

## 2024-07-08 RX ORDER — IBUPROFEN 600 MG/1
600 TABLET ORAL EVERY 6 HOURS PRN
Qty: 120 TABLET | Refills: 1 | Status: SHIPPED | OUTPATIENT
Start: 2024-07-08 | End: 2024-09-06

## 2024-07-08 RX ORDER — MEPERIDINE HYDROCHLORIDE 50 MG/ML
12.5 INJECTION INTRAMUSCULAR; INTRAVENOUS; SUBCUTANEOUS EVERY 5 MIN PRN
Status: DISCONTINUED | OUTPATIENT
Start: 2024-07-08 | End: 2024-07-08 | Stop reason: HOSPADM

## 2024-07-08 RX ORDER — MIDAZOLAM HYDROCHLORIDE 1 MG/ML
INJECTION INTRAMUSCULAR; INTRAVENOUS PRN
Status: DISCONTINUED | OUTPATIENT
Start: 2024-07-08 | End: 2024-07-08 | Stop reason: SDUPTHER

## 2024-07-08 RX ORDER — ROCURONIUM BROMIDE 10 MG/ML
INJECTION, SOLUTION INTRAVENOUS PRN
Status: DISCONTINUED | OUTPATIENT
Start: 2024-07-08 | End: 2024-07-08 | Stop reason: SDUPTHER

## 2024-07-08 RX ORDER — SENNOSIDES A AND B 8.6 MG/1
1 TABLET, FILM COATED ORAL NIGHTLY
Status: DISCONTINUED | OUTPATIENT
Start: 2024-07-08 | End: 2024-07-08

## 2024-07-08 RX ORDER — DIPHENHYDRAMINE HYDROCHLORIDE 50 MG/ML
12.5 INJECTION INTRAMUSCULAR; INTRAVENOUS
Status: DISCONTINUED | OUTPATIENT
Start: 2024-07-08 | End: 2024-07-08 | Stop reason: HOSPADM

## 2024-07-08 RX ORDER — FENTANYL CITRATE 50 UG/ML
INJECTION, SOLUTION INTRAMUSCULAR; INTRAVENOUS PRN
Status: DISCONTINUED | OUTPATIENT
Start: 2024-07-08 | End: 2024-07-08 | Stop reason: SDUPTHER

## 2024-07-08 RX ORDER — SENNOSIDES A AND B 8.6 MG/1
1 TABLET, FILM COATED ORAL 2 TIMES DAILY
Qty: 60 TABLET | Refills: 11 | Status: SHIPPED | OUTPATIENT
Start: 2024-07-08 | End: 2025-07-08

## 2024-07-08 RX ADMIN — ONDANSETRON 4 MG: 2 INJECTION INTRAMUSCULAR; INTRAVENOUS at 11:47

## 2024-07-08 RX ADMIN — HYDROMORPHONE HYDROCHLORIDE 0.5 MG: 1 INJECTION, SOLUTION INTRAMUSCULAR; INTRAVENOUS; SUBCUTANEOUS at 12:44

## 2024-07-08 RX ADMIN — PROPOFOL 200 MG: 10 INJECTION, EMULSION INTRAVENOUS at 11:32

## 2024-07-08 RX ADMIN — PROPOFOL 50 MG: 10 INJECTION, EMULSION INTRAVENOUS at 12:07

## 2024-07-08 RX ADMIN — SUGAMMADEX 500 MG: 100 INJECTION, SOLUTION INTRAVENOUS at 12:01

## 2024-07-08 RX ADMIN — MIDAZOLAM 2 MG: 1 INJECTION INTRAMUSCULAR; INTRAVENOUS at 11:29

## 2024-07-08 RX ADMIN — ALBUTEROL SULFATE 6 PUFF: 90 AEROSOL, METERED RESPIRATORY (INHALATION) at 12:03

## 2024-07-08 RX ADMIN — FENTANYL CITRATE 100 MCG: 50 INJECTION, SOLUTION INTRAMUSCULAR; INTRAVENOUS at 11:32

## 2024-07-08 RX ADMIN — DIPHENHYDRAMINE HYDROCHLORIDE 25 MG: 50 INJECTION, SOLUTION INTRAMUSCULAR; INTRAVENOUS at 10:50

## 2024-07-08 RX ADMIN — DEXAMETHASONE SODIUM PHOSPHATE 10 MG: 10 INJECTION, SOLUTION INTRAMUSCULAR; INTRAVENOUS at 11:38

## 2024-07-08 RX ADMIN — LIDOCAINE HYDROCHLORIDE 50 MG: 10 INJECTION, SOLUTION EPIDURAL; INFILTRATION; INTRACAUDAL; PERINEURAL at 11:32

## 2024-07-08 RX ADMIN — ROCURONIUM BROMIDE 50 MG: 10 INJECTION, SOLUTION INTRAVENOUS at 11:32

## 2024-07-08 RX ADMIN — SODIUM CHLORIDE, POTASSIUM CHLORIDE, SODIUM LACTATE AND CALCIUM CHLORIDE: 600; 310; 30; 20 INJECTION, SOLUTION INTRAVENOUS at 09:05

## 2024-07-08 RX ADMIN — ALBUTEROL SULFATE 6 PUFF: 90 AEROSOL, METERED RESPIRATORY (INHALATION) at 12:09

## 2024-07-08 ASSESSMENT — PAIN - FUNCTIONAL ASSESSMENT
PAIN_FUNCTIONAL_ASSESSMENT: NONE - DENIES PAIN
PAIN_FUNCTIONAL_ASSESSMENT: NONE - DENIES PAIN

## 2024-07-08 ASSESSMENT — PAIN DESCRIPTION - DESCRIPTORS: DESCRIPTORS: BURNING

## 2024-07-08 ASSESSMENT — PAIN SCALES - GENERAL
PAINLEVEL_OUTOF10: 3
PAINLEVEL_OUTOF10: 7

## 2024-07-08 ASSESSMENT — PAIN DESCRIPTION - LOCATION: LOCATION: VAGINA

## 2024-07-08 NOTE — H&P
OB/GYN Pre-Op H&P  [facility]    Patient Name: aNta Myles     Patient : 1975  Room/Bed: [facility]/NONE  Admission Date/Time: 2024  7:41 AM  Primary Care Physician: Mindi Hamlin APRN - NP  MRN: [id number]    Date: 2024  Time: 10:52 AM    The patient was seen in pre-op holding. She is here for hysteroscopy, cervical polypectomy.     Nata Myles is a 50 yo female G*P* who has a history of postcoital bleeding. She reported period-like bleeding with intercourse with her boyfriend for the past several months. She denied pain with this. She is post-menopausal. She denied hormone replacement exposure. Her last pap smear was in  and the results were normal. Pelvic exam at her last OBGYN office showed endocervical polyp measuring 1 cm from the cervical os. Pelvic ultrasound on 24 noted a hyperechoic material the lower uterine segment/cervix that may demonstrate some mild vascularity. Due to the size of the cervical polyp, plan was made to remove it surgically in the OR. Patient was agreeable with the plan.     The procedure risks and complications were reviewed.  The labs, Consent, and H&P were reviewed and updated.  The patient was counseled on the possibility of  the need of a second surgery.  The patient voiced understanding and had all of her questions answered. The possibility of incomplete removal of abnormal tissue was discussed.    OBSTETRICAL HISTORY:   OB History   No obstetric history on file.       PAST MEDICAL HISTORY:   has a past medical history of Anxiety and Hypertension.    PAST SURGICAL HISTORY:   has a past surgical history that includes Tubal ligation.    ALLERGIES:  Allergies as of 06/10/2024 - Fully Reviewed 2024   Allergen Reaction Noted    Azithromycin Hives 2021    Erythromycin  2015    K-y jelly [cream base] Swelling 2024    Other Hives 2024    Pcn [penicillins]  2015       MEDICATIONS:  Current

## 2024-07-08 NOTE — ANESTHESIA POSTPROCEDURE EVALUATION
POST- ANESTHESIA EVALUATION       Pt Name: Nata Myles  MRN: [id number]  YOB: 1975  Date of evaluation: 7/8/2024  Time:  1:51 PM      /68   Pulse 84   Temp 97.5 °F (36.4 °C)   Resp 16   Ht 1.702 m (5' 7\")   Wt (!) 143.8 kg (317 lb)   SpO2 94%   BMI 49.65 kg/m²      Consciousness Level  Awake  Cardiopulmonary Status  Stable  Pain Adequately Treated YES  Nausea / Vomiting  NO  Adequate Hydration  YES  Anesthesia Related Complications NONE      Electronically signed by Hai Olea MD on 7/8/2024 at 1:51 PM     Department of Anesthesiology  Postprocedure Note    Patient: Nata Myles  MRN: [id number]  YOB: 1975  Date of evaluation: 7/8/2024    Procedure Summary       Date: 07/08/24 Room / Location: [address]    Anesthesia Start: 1126 Anesthesia Stop: 1216    Procedure: HYSTEROSCOPY, CERVICAL AND ENDOMETRIAL POLYPECTOMY, DILATION AND CURETTAGE Diagnosis:       Cervical polyp      (Cervical polyp [N84.1])    Surgeons: Cinthya Miller DO Responsible Provider: Hai Olea MD    Anesthesia Type: general ASA Status: 3            Anesthesia Type: No value filed.    Rush Phase I: Rush Score: 10    Rush Phase II: Rush Score: 10    Anesthesia Post Evaluation    No notable events documented.

## 2024-07-08 NOTE — OP NOTE
Operative Note  Department of Obstetrics and Gynecology  [facility]       Patient: Nata Myles   : 1975  MRN: [id number]       Acct: [id number]   PCP: Mindi Hamlin APRN - NP  Date of Procedure: 24    Pre-operative Diagnosis: 49 y.o. female   Postcoital bleeding  Cervical polyp  BMI 50       Post-operative Diagnosis:  Postcoital bleeding  Cervical polyp  BMI 50    Procedure: Hysteroscopy, Dilation and Curettage, cervical polypectomy, endometrial polypectomy    Surgeon: Dr. Miller  Assistants: [place] MD, PGY3; Marifer Robbins DO, PGY1    Anesthesia: general via ETT    Indications: 49-year-old female presents today for a cervical polypectomy with hysteroscope and dilation curettage procedure.  She was previously seen in the outpatient office for concerns of postcoital bleeding.  Speculum exam revealed at that time cervical polyp that was noted to be friable and decision was made to proceed with surgical management.  All risk/benefits and alternatives to surgical management were discussed with the patient and written consent was obtained preoperatively.    Procedure Details:   The patient was seen in the pre-op room. The risks, benefits, complications, treatment options, and expected outcomes were discussed with the patient. The patient concurred with the proposed plan, giving informed consent. The patient was taken to the Operating Room and identified as Nata Myles and the procedure was verified. A Time Out was held and the above information confirmed.     After administration of general anesthesia, the patient was placed in the dorsolithotomy position with Yellofin stirrups and examination under general anesthesia performed with findings as noted below. The patient was prepped and draped in the usual sterile fashion. A weighted speculum was placed into the vagina to visualize the cervix. The cervix was grasped with a Jacobsson Tenaculum.  An approximately 1 x 1 cm

## 2024-07-08 NOTE — ANESTHESIA POSTPROCEDURE EVALUATION
POST-ANESTHESIA EVALUATION       Pt Name: Nata Myles  MRN: [id number]  YOB: 1975  Date of evaluation: 7/8/2024  Time:  1:50 PM      /68   Pulse 84   Temp 97.5 °F (36.4 °C)   Resp 16   Ht 1.702 m (5' 7\")   Wt (!) 143.8 kg (317 lb)   SpO2 94%   BMI 49.65 kg/m²      Consciousness Level  []  Awake  []  Sedated but arouseable  []  Deeply sedated  Cardiopulmonary Status  []  Stable with patent airway    []  OTHER  Pain Adequately Treated []   YES   []  NO    Nausea / Vomiting  []   YES   []  NO  Adequate Hydration  []   YES   []  NO  Anesthesia Related Complications []   YES   []  NO      Electronically signed by Hai Olea MD on 7/8/2024 at 1:50 PM    Department of Anesthesiology  Postprocedure Note    Patient: Nata Myles  MRN: [id number]  YOB: 1975  Date of evaluation: 7/8/2024    Procedure Summary       Date: 07/08/24 Room / Location: [address]    Anesthesia Start: 1126 Anesthesia Stop: 1216    Procedure: HYSTEROSCOPY, CERVICAL AND ENDOMETRIAL POLYPECTOMY, DILATION AND CURETTAGE Diagnosis:       Cervical polyp      (Cervical polyp [N84.1])    Surgeons: Cinthya Miller DO Responsible Provider: Hai Olea MD    Anesthesia Type: general ASA Status: 3            Anesthesia Type: No value filed.    Rush Phase I: Rush Score: 10    Rush Phase II: Rush Score: 10    Anesthesia Post Evaluation    No notable events documented.

## 2024-07-08 NOTE — ANESTHESIA PRE PROCEDURE
Department of Anesthesiology  Preprocedure Note       Name:  Nata Myles   Age:  49 y.o.  :  1975                                          MRN:  [id number]         Date:  2024      Surgeon: Surgeon(s):  Cinthya Miller DO    Procedure: Procedure(s):  HYSTEROSCOPY, CERVICAL POLYPECTOMY    Medications prior to admission:   Prior to Admission medications    Medication Sig Start Date End Date Taking? Authorizing Provider   atenolol-chlorthalidone (TENORETIC) 50-25 MG per tablet Take 1 tablet by mouth daily 3/7/24   Ector Kong MD   Azelastine-Fluticasone 137-50 MCG/ACT SUSP 1 spray by Nasal route as needed 22   Ector Kong MD   busPIRone (BUSPAR) 10 MG tablet Take 1 tablet by mouth 3 times daily    Ector Kong MD   estrogens conjugated (PREMARIN) 0.625 MG/GM CREA vaginal cream Place 1.5 g vaginally Twice a Week 24  Ector Kong MD   VESICARE 5 MG tablet Take 1 tablet by mouth daily    Ector Kong MD   venlafaxine (EFFEXOR XR) 75 MG extended release capsule Take 1 capsule by mouth daily    Ector Kong MD   nystatin (MYCOSTATIN) 751903 UNIT/GM powder Apply 3 times daily. 24   Sierra Panchal DO   brompheniramine-pseudoephedrine-DM 2-30-10 MG/5ML syrup Take by mouth  Patient not taking: Reported on 2024   Ector Kong MD   acetaminophen (TYLENOL) 500 MG tablet Take 1-2 tabs every 6 hours as needed for pain.  Patient not taking: Reported on 2024 7/10/21   Daisy Rowland DO   ibuprofen (ADVIL;MOTRIN) 600 MG tablet Take 1 tablet by mouth 4 times daily as needed for Pain  Patient not taking: Reported on 2024 7/10/21   Daisy Rowland DO   HYDROcodone-acetaminophen (NORCO) 5-325 MG per tablet Take 1-2 tablets by mouth every 4 hours as needed for pain.  Patient not taking: Reported on 2024   Jean Knight APRN - CNP   sertraline (ZOLOFT) 100 MG tablet Take 1 tablet by mouth daily  Patient not

## 2024-07-10 LAB — SURGICAL PATHOLOGY REPORT: NORMAL

## 2024-07-22 ENCOUNTER — OFFICE VISIT (OUTPATIENT)
Dept: OBGYN | Age: 49
End: 2024-07-22
Payer: MEDICAID

## 2024-07-22 ENCOUNTER — HOSPITAL ENCOUNTER (OUTPATIENT)
Age: 49
Setting detail: SPECIMEN
Discharge: HOME | End: 2024-07-22

## 2024-07-22 VITALS
HEART RATE: 83 BPM | WEIGHT: 293 LBS | BODY MASS INDEX: 49.34 KG/M2 | DIASTOLIC BLOOD PRESSURE: 76 MMHG | SYSTOLIC BLOOD PRESSURE: 125 MMHG

## 2024-07-22 DIAGNOSIS — Z48.89 POSTOPERATIVE VISIT: Primary | ICD-10-CM

## 2024-07-22 DIAGNOSIS — R30.0 DYSURIA: ICD-10-CM

## 2024-07-22 DIAGNOSIS — Z98.890 STATUS POST HYSTEROSCOPY: ICD-10-CM

## 2024-07-22 PROCEDURE — 99213 OFFICE O/P EST LOW 20 MIN: CPT

## 2024-07-22 RX ORDER — PHENAZOPYRIDINE HYDROCHLORIDE 100 MG/1
100 TABLET, FILM COATED ORAL 3 TIMES DAILY PRN
Qty: 3 TABLET | Refills: 0 | Status: SHIPPED | OUTPATIENT
Start: 2024-07-22

## 2024-07-22 NOTE — PROGRESS NOTES
Postoperative Visit    Nata Myles is a 49 y.o. female 2 weeks Post Operative s/p Hysteroscopy, Dilation and Curettage on 7/8/24    The patient was seen. She is doing well. She reports some dysuria since the procedure. She denies hematuria.    She is tolerating oral intake. She denies any dizziness or shortness of breath or chest pain.  Her bowels are regular. Patient denies headache, nausea, vomiting, fever, chills, abdominal pain, diarrhea, change in color/amount/odor of vaginal discharge.     Patient Active Problem List   Diagnosis    Vaginal discharge    Cervical polyp    Yeast infection    Mixed incontinence urge and stress    S/p Hscope, D&C, Cervical Polypectomy 7/8/24    Abnormal uterine bleeding due to endometrial polyp       Vitals:   Blood pressure 125/76, pulse 83, weight (!) 142.9 kg (315 lb).    Physical Exam:  Chaperone for Intimate Exam: not indicated     General:  no apparent distress, alert, and cooperative  Lungs:  No increased work of breathing, good air exchange, clear to auscultation bilaterally, no crackles or wheezing  Heart:  regular rate and rhythm and no murmur    Abdomen: Abdomen soft, non-tender. BS normal. No masses,  No organomegaly  Extremities:  no calf tenderness, non edematous    Assessment:  Nata Myles is a 49 y.o. female 2 weeks Post Operative s/p Hysteroscopy, Dilation and Curettage on 7/8/24   - Doing well, VSS   - Patient meeting post-operative milestones approprietly    - Pathology reviewed and benign with endocervical polyp with microglandular hyperplasia   - Patient denies any current vaginal bleeding, odor, discharge.    - Continue post-operative care.   - Patient would like to return for 6 wk postoperative visit to ensure symptoms including dysuria and vaginal bleeding have fully resolved.     Dysuria   - Patient reports dysuria for last day    - Urine culture ordered    - AZO rx sent for symptomatic relief    - Will send antibiotic once urine culture results

## 2024-07-22 NOTE — PROGRESS NOTES
Attending Physician Statement  I have discussed the care of Nata Myles, including pertinent history and exam findings, with the resident. I have reviewed the key elements of all parts of the encounter with the resident.  I agree with the assessment, plan and orders as documented by the resident.  (GE Modifier)    Radha Jacobsen [facility]  7/22/2024, 3:00 PM

## 2024-07-24 DIAGNOSIS — N30.00 ACUTE CYSTITIS WITHOUT HEMATURIA: Primary | ICD-10-CM

## 2024-07-24 LAB
MICROORGANISM SPEC CULT: ABNORMAL
MICROORGANISM SPEC CULT: ABNORMAL
SERVICE CMNT-IMP: ABNORMAL
SPECIMEN DESCRIPTION: ABNORMAL

## 2024-07-24 RX ORDER — SULFAMETHOXAZOLE AND TRIMETHOPRIM 800; 160 MG/1; MG/1
1 TABLET ORAL 2 TIMES DAILY
Qty: 6 TABLET | Refills: 0 | Status: SHIPPED | OUTPATIENT
Start: 2024-07-24 | End: 2024-07-27

## 2024-08-19 ENCOUNTER — OFFICE VISIT (OUTPATIENT)
Dept: OBGYN | Age: 49
End: 2024-08-19

## 2024-08-19 ENCOUNTER — HOSPITAL ENCOUNTER (OUTPATIENT)
Age: 49
Setting detail: SPECIMEN
Discharge: HOME | End: 2024-08-19

## 2024-08-19 VITALS
HEART RATE: 76 BPM | BODY MASS INDEX: 45.99 KG/M2 | HEIGHT: 67 IN | WEIGHT: 293 LBS | DIASTOLIC BLOOD PRESSURE: 80 MMHG | SYSTOLIC BLOOD PRESSURE: 131 MMHG

## 2024-08-19 DIAGNOSIS — Z09 POSTOPERATIVE EXAMINATION: Primary | ICD-10-CM

## 2024-08-19 DIAGNOSIS — Z78.0 MENOPAUSE: ICD-10-CM

## 2024-08-19 PROBLEM — F32.A ANXIETY AND DEPRESSION: Status: ACTIVE | Noted: 2024-08-19

## 2024-08-19 PROBLEM — F41.9 ANXIETY AND DEPRESSION: Status: ACTIVE | Noted: 2024-08-19

## 2024-08-19 LAB — FSH SERPL-ACNC: 25.4 MIU/ML

## 2024-08-19 PROCEDURE — 99024 POSTOP FOLLOW-UP VISIT: CPT | Performed by: STUDENT IN AN ORGANIZED HEALTH CARE EDUCATION/TRAINING PROGRAM

## 2024-08-19 NOTE — PROGRESS NOTES
Postoperative Visit    Nata Myles is a 49 y.o. female , 6 weeks Post Operative s/p hysteroscopy D&C with cervical polypectomy  on 24    The patient was seen. She is doing well overall. She is tolerating oral intake. She denies any dizziness or shortness of breath or chest pain.  Her bowels are regular and she denies any urinary tract symptomology. Patient denies headache, nausea, vomiting, fever, chills, abdominal pain, diarrhea, change in color/amount/odor of vaginal discharge, dysuria or, hematuria.     The patient went almost 2 yrs without having a period. Then a few weeks after her surgery, she had tender breasts and then 7 days of bleeding afterwards. She said it felt like a normal period.     Vitals:   Vitals:    24 1111   BP: 131/80   Site: Right Upper Arm   Position: Sitting   Cuff Size: Large Adult   Pulse: 76   Weight: (!) 144.7 kg (319 lb)   Height: 1.702 m (5' 7\")       Physical Exam:   General appearance: no apparent distress, alert and cooperative  HEENT: head atraumatic, normocephalic, trachea midline, moist mucous membranes   Neurologic: alert, oriented, normal speech, no focal findings or movement disorder noted  Lungs: no increased work of breathing, good air exchange, clear to auscultation bilaterally, no crackles or wheezing  Heart: regular rate and rhythm   Abdomen: soft, non-distended or tender   Extremities:  no calf tenderness bilaterally, non-edematous bilaterally   Musculoskeletal: no gross abnormalities, range of motion appropriate for age   Psychiatric: mood appropriate, normal affect      Assessment/Plan:  Nata Myles is a 49 y.o. female , 6 weeks Post Operative s/p hysteroscopy D&C with cervical polypectomy  on 24    - VSS     - Reviewed pathology with patient    - Overall she is doing well   - Cleared from any post op restrictions   - FSH level ordered to ensure she is in menopause   - Discussed that PMB is abnormal but is evaluated with US and biopsy

## 2024-08-26 NOTE — PROGRESS NOTES
no history of kidney stones.     How long have you noticed the prolapse: 4 years year(s)     Interested in All options     Urinary Incontinence: yes  Do you wear pads: yes  Pad Size: brief  How often do you have to change the pad: 5-6 times a day        Do you have trouble voiding, expelling, or having a bowel movement: yes  Do you reduce prolapse: no  Do you splint: no  Do you struggle w/ constipation: no  Do you do heavy lifting: yes  Are you sexually active: yes  If not currently sexually active, are you interested in becoming sexually active: n/a     Any H/O of UTI's: yes  If yes, were there cultures done: yes        AUA Symptom Score Sheet  Over the last month, how often have you had a sensation of not emptying your bladder completely after you finished urinating?: More than half the time  During the last month, how often have you had to urinate again less than two hours after you finished urinating?: (!) Almost always  During the last month, how often have you stopped and started again several times when you urinated?: Not at all  During the last month, how often have you found it difficult to postpone urination?: (!) Almost Always  During the last month, how often have you had a weak urinary stream?: Not at all  During the last month, how often have you had to push or strain to begin urination?: Not at all  During the last month, how many times did you most typically get up to urinate from the time you went to bed at night until the time you got up in the morning?: About half the time  Symptom Score 1 - 7 Mild, 8 - 19 Moderate, 20 - 35 Severe: 17     Past Medical History:   Diagnosis Date    Anxiety     Depression     Hypertension       Past Surgical History:   Procedure Laterality Date    HYSTEROSCOPY N/A 7/8/2024    HYSTEROSCOPY, CERVICAL AND ENDOMETRIAL POLYPECTOMY, DILATION AND CURETTAGE performed by Cinthya Miller DO at [facility] OR    HYSTEROSCOPY W/ POLYPECTOMY  07/08/2024    HYSTEROSCOPY, CERVICAL AND  RESULTS:  Results for orders placed or performed in visit on 08/27/24   US POST VOID RESIDUAL    Narrative    PVR 0 ML   POCT Urinalysis No Micro (Auto)   Result Value Ref Range    Color, UA      Clarity, UA      Glucose, UA POC NORM     Bilirubin, UA NEG     Ketones, UA NEG     Spec Grav, UA 1.005     Blood, UA POC NORM     pH, UA 8     Protein, UA POC TR     Urobilinogen, UA 1     Leukocytes, UA 75     Nitrite, UA neg         ASSESSMENT/PLAN:    Urinary incontinence, mixed  -     POCT Urinalysis No Micro (Auto)  -     Cystoscopy; Future  -Large amount of UUI /  BEKAH with cough; recommend bladder testing (UDS/Cysto). Discussion of sling provided.     History of vaginal bleeding  - Denies further bleeding    Enuresis  -     [facility] [place]  -Referral to discuss sleep study    Snoring  -     [facility] [place]  -Referral to discuss sleep study    Class 3 severe obesity with body mass index (BMI) of 50.0 to 59.9 in adult, unspecified obesity type, unspecified whether serious comorbidity present (HCC)  -     [facility] [place]  -Referral to discuss sleep study    Proteinuria, unspecified type  - Denies renal dysfunction; recent labs normal.    Screening for colon cancer  -     [facility]  -Referral for screening colonoscopy, no red flags present    [place] grade 1 cystocele  [place] grade 1 rectocele  Fecal urgency  -Recommend lifestyle changes. Referral to GI.     The patient was counseled regarding review of all conditions discussed.     2.   Educational handouts were discussed & given when applicable.     3.   Testing recommendations were given as indicated.     4.   Detailed questionnaires regarding the patient's specific condition were given to the patient when indicated. The patient understands that these are her responsibility to complete and return on a voluntary basis. Reminders to complete the questionnaires will not

## 2024-08-27 ENCOUNTER — OFFICE VISIT (OUTPATIENT)
Age: 49
End: 2024-08-27
Payer: MEDICAID

## 2024-08-27 VITALS
SYSTOLIC BLOOD PRESSURE: 112 MMHG | OXYGEN SATURATION: 99 % | WEIGHT: 293 LBS | DIASTOLIC BLOOD PRESSURE: 82 MMHG | BODY MASS INDEX: 54.66 KG/M2 | HEART RATE: 75 BPM

## 2024-08-27 DIAGNOSIS — R80.9 PROTEINURIA, UNSPECIFIED TYPE: ICD-10-CM

## 2024-08-27 DIAGNOSIS — Z87.42 HISTORY OF VAGINAL BLEEDING: ICD-10-CM

## 2024-08-27 DIAGNOSIS — N81.6 BADEN-WALKER GRADE 1 RECTOCELE: ICD-10-CM

## 2024-08-27 DIAGNOSIS — N39.46 URINARY INCONTINENCE, MIXED: Primary | ICD-10-CM

## 2024-08-27 DIAGNOSIS — R15.2 FECAL URGENCY: ICD-10-CM

## 2024-08-27 DIAGNOSIS — E66.01 CLASS 3 SEVERE OBESITY WITH BODY MASS INDEX (BMI) OF 50.0 TO 59.9 IN ADULT, UNSPECIFIED OBESITY TYPE, UNSPECIFIED WHETHER SERIOUS COMORBIDITY PRESENT (HCC): ICD-10-CM

## 2024-08-27 DIAGNOSIS — N81.10 BADEN-WALKER GRADE 1 CYSTOCELE: ICD-10-CM

## 2024-08-27 DIAGNOSIS — R32 ENURESIS: ICD-10-CM

## 2024-08-27 DIAGNOSIS — R06.83 SNORING: ICD-10-CM

## 2024-08-27 DIAGNOSIS — Z12.11 SCREENING FOR COLON CANCER: ICD-10-CM

## 2024-08-27 LAB
BILIRUBIN, POC: ABNORMAL
BLOOD URINE, POC: ABNORMAL
CLARITY, POC: ABNORMAL
COLOR, POC: ABNORMAL
GLUCOSE URINE, POC: ABNORMAL
KETONES, POC: ABNORMAL
LEUKOCYTE EST, POC: 75
NITRITE, POC: ABNORMAL
PH, POC: 8
PROTEIN, POC: ABNORMAL
SPECIFIC GRAVITY, POC: 1
UROBILINOGEN, POC: 1

## 2024-08-27 PROCEDURE — 81003 URINALYSIS AUTO W/O SCOPE: CPT

## 2024-08-27 PROCEDURE — 51798 US URINE CAPACITY MEASURE: CPT

## 2024-08-27 PROCEDURE — 99204 OFFICE O/P NEW MOD 45 MIN: CPT

## 2024-08-27 NOTE — PROGRESS NOTES
How long have you noticed the prolapse: 4 years year(s)    Interested in All options    Urinary Incontinence: yes  Do you wear pads: yes  Pad Size: brief  How often do you have to change the pad: 5-6 time a day      Do you have trouble voiding, expelling, or having a bowel movement: yes  Do you reduce prolapse: no  Do you splint: no  Do you struggle w/ constipation: no  Do you do heavy lifting: yes  Are you sexually active: yes  If not currently sexually active, are you interested in becoming sexually active:  N/A    Any H/O of UTI's: yes  If yes, were there cultures done: yes

## 2024-08-27 NOTE — PATIENT INSTRUCTIONS
[facility]    URGE SUPPRESSION EXERCISES    ? Do you rush to the bathroom for fear of losing urine?    ? Do you dribble urine on your way to the bathroom?        The feeling of urgency to get to the bathroom most likely is caused by bladder spasms.  Rushing to the bathroom during spasms or urge causes your bladder to bounce.  This causes more bladder spasms.  Learning urge suppression may help you control and / or eliminate these spasms so that you can stop the feeling of urgency and walk to the bathroom calmly.    To stop the feeling of urgency to urinate:    1. Remain in the same position in which you began having the feeling to urinate.  If You are sitting, remain   seated.  If you are walking, stop walking but remain standing.    2. Tighten your rectum, then let go a little, tighten again and let go a little.  Keep doing this until the urge   feeling has passed (about 15 - 30 seconds).By tightening and letting go of your rectum, you stimulate a   nerve in your Pelvic muscles which causes the bladder to relax.  This stops the strong feeling to urinate.    3. Tell yourself that you are in control of your bladder - not the other way around.    4. Once the urge is over, take a deep breath and relax.  Then walk to the bathroom calmly.              [facility] UROGYNECOLOGY & PELVIC REHABILITATION    Strengthening Exercises for Pelvic Floor Muscles      Introduction  Pregnancy, childbirth, obesity and excessive straining from frequent constipation can weaken a woman's pelvic floor muscles.    Weak pelvic floor muscles can cause urinary incontinence or loss of urine when pressure is exerted on the bladder (for example, by coughing, laughing, running, jumping or lifting).   This is called stress incontinence.  Aging and decreased levels of estrogen during and following menopause also can contribute to urinary incontinence.    Many women with urinary incontinence can  of the cone.    Seeing and Maintaining Results    After about six to 12 weeks of doing pelvic floor muscle strengthening exercises, you should notice improvement in pelvic floor muscle strength and a decrease in urinary leakage.  Once your pelvic floor muscles have strengthened,  you will not need to do this exercise as rigorously.  However, the benefits of pelvic floor muscle exercises will continue only as long as you do the exercise.  Make pelvic floor muscle exercises a lifelong practice.         [facility] UROGYNECOLOGY & PELVIC REHABILITATION    DIET & DAILY HABITS  Can this affect your bladder or bowel control?    There is no “diet” to cure urinary or fecal problems.  However, there are certain dietary matters you should be concerned about.    Many people who have bladder control problems reduce the amount of liquids they drink in hope they will urinate less often.  Although less fluid intake results in less urine production, the smaller amount is more concentrated and thus, is more irritating to the bladder surface.  Highly concentrated urine (dark yellow, strong-smelling) urine may cause you to go to the bathroom more frequently.  It also encourages the growth of bacteria, which may lead to a urinary tract infection.  Do not restrict fluids to control incontinence without advice from your physician.  Always follow your health care provider's instructions.    Some foods cause urine to smell bad or peculiar.  The most notable food is asparagus.  Another cause of foul-smelling urine, and the most dangerous cause, is urinary tract infection.  If you notice that your urine has a strong odor and you have not eaten any foods that would cause this, you should see a health care provider and have a specimen of your urine tested for infection.    Some medicines may cause your urine to be discolored or have an unusual odor.  Some are medicines that you take for bladder inflammation or for urine tests.  If your

## 2024-08-29 NOTE — TELEPHONE ENCOUNTER
Procedure scheduled/Dr Mchugh  Procedure: colonoscopy  Dx: colon screen  Date: 09/18/24  Time:7:30 am/ arrival 5:30 am  Hospital:[facility] phone call: K[facility]NG  Bowel Prep instructions given: Alvin/ Dulcolax  In office/via phone: phone  Clearance needed: FERNANDEZ

## 2024-09-04 RX ORDER — POLYETHYLENE GLYCOL 3350, SODIUM SULFATE ANHYDROUS, SODIUM BICARBONATE, SODIUM CHLORIDE, POTASSIUM CHLORIDE 236; 22.74; 6.74; 5.86; 2.97 G/4L; G/4L; G/4L; G/4L; G/4L
4 POWDER, FOR SOLUTION ORAL ONCE
Qty: 4000 ML | Refills: 0 | Status: SHIPPED | OUTPATIENT
Start: 2024-09-04 | End: 2024-09-04

## 2024-09-04 RX ORDER — BISACODYL 5 MG/1
TABLET, DELAYED RELEASE ORAL
Qty: 4 TABLET | Refills: 0 | Status: SHIPPED | OUTPATIENT
Start: 2024-09-04

## 2024-09-04 NOTE — TELEPHONE ENCOUNTER
Called pt giving her the option to take a high dose of Miralax/Dulcolax for her bowel prep due to the fact that she has an allergy to Golytley & Suprep.  Asked to her to call us regarding her bowel prep.

## 2024-09-05 NOTE — TELEPHONE ENCOUNTER
Patient left a voicemail stating that she is not allergic to Miralax and Dulcolax.    Please advise.

## 2024-09-11 ENCOUNTER — HOSPITAL ENCOUNTER (OUTPATIENT)
Dept: PREADMISSION TESTING | Age: 49
Discharge: HOME | End: 2024-09-15

## 2024-09-11 VITALS — HEIGHT: 67 IN | WEIGHT: 293 LBS | BODY MASS INDEX: 45.99 KG/M2

## 2024-09-13 ENCOUNTER — TELEPHONE (OUTPATIENT)
Dept: GASTROENTEROLOGY | Age: 49
End: 2024-09-13

## 2024-09-17 ENCOUNTER — PROCEDURE VISIT (OUTPATIENT)
Age: 49
End: 2024-09-17

## 2024-09-17 VITALS — DIASTOLIC BLOOD PRESSURE: 71 MMHG | SYSTOLIC BLOOD PRESSURE: 114 MMHG | OXYGEN SATURATION: 95 % | HEART RATE: 78 BPM

## 2024-09-17 DIAGNOSIS — Z87.42 HISTORY OF VAGINAL BLEEDING: ICD-10-CM

## 2024-09-17 DIAGNOSIS — R15.2 FECAL URGENCY: ICD-10-CM

## 2024-09-17 DIAGNOSIS — E66.01 CLASS 3 SEVERE OBESITY WITH BODY MASS INDEX (BMI) OF 50.0 TO 59.9 IN ADULT, UNSPECIFIED OBESITY TYPE, UNSPECIFIED WHETHER SERIOUS COMORBIDITY PRESENT (HCC): ICD-10-CM

## 2024-09-17 DIAGNOSIS — R80.9 PROTEINURIA, UNSPECIFIED TYPE: ICD-10-CM

## 2024-09-17 DIAGNOSIS — N39.46 URINARY INCONTINENCE, MIXED: Primary | ICD-10-CM

## 2024-09-17 DIAGNOSIS — N95.2 VAGINAL ATROPHY: ICD-10-CM

## 2024-09-17 DIAGNOSIS — Z41.9 ELECTIVE SURGERY: ICD-10-CM

## 2024-09-17 DIAGNOSIS — R32 ENURESIS: ICD-10-CM

## 2024-09-17 DIAGNOSIS — N36.42 INTRINSIC SPHINCTER DEFICIENCY (ISD): ICD-10-CM

## 2024-09-17 DIAGNOSIS — N81.10 BADEN-WALKER GRADE 1 CYSTOCELE: ICD-10-CM

## 2024-09-17 DIAGNOSIS — N81.6 BADEN-WALKER GRADE 1 RECTOCELE: ICD-10-CM

## 2024-09-17 LAB
BILIRUBIN, POC: NORMAL
BLOOD URINE, POC: NORMAL
CLARITY, POC: NORMAL
COLOR, POC: NORMAL
GLUCOSE URINE, POC: NORMAL MG/DL
KETONES, POC: NORMAL MG/DL
LEUKOCYTE EST, POC: NORMAL
NITRITE, POC: NORMAL
PH, POC: 7
PROTEIN, POC: NORMAL MG/DL
SPECIFIC GRAVITY, POC: 1.01
UROBILINOGEN, POC: NORMAL MG/DL

## 2024-10-01 ENCOUNTER — HOSPITAL ENCOUNTER (OUTPATIENT)
Dept: PREADMISSION TESTING | Age: 49
Discharge: HOME | End: 2024-10-05
Payer: MEDICAID

## 2024-10-01 VITALS
WEIGHT: 293 LBS | SYSTOLIC BLOOD PRESSURE: 120 MMHG | TEMPERATURE: 98.4 F | OXYGEN SATURATION: 96 % | DIASTOLIC BLOOD PRESSURE: 71 MMHG | HEART RATE: 70 BPM | HEIGHT: 67 IN | BODY MASS INDEX: 45.99 KG/M2

## 2024-10-01 DIAGNOSIS — Z01.818 PRE-OP TESTING: Primary | ICD-10-CM

## 2024-10-01 LAB
ANION GAP SERPL CALCULATED.3IONS-SCNC: 13 MMOL/L (ref 9–16)
BASOPHILS # BLD: 0.08 K/UL (ref 0–0.2)
BASOPHILS NFR BLD: 1 % (ref 0–2)
BUN SERPL-MCNC: 11 MG/DL (ref 6–20)
CALCIUM SERPL-MCNC: 8.8 MG/DL (ref 8.6–10.4)
CHLORIDE SERPL-SCNC: 103 MMOL/L (ref 98–107)
CO2 SERPL-SCNC: 24 MMOL/L (ref 20–31)
CREAT SERPL-MCNC: 1 MG/DL (ref 0.5–0.9)
EOSINOPHIL # BLD: 0.13 K/UL (ref 0–0.44)
EOSINOPHILS RELATIVE PERCENT: 2 % (ref 1–4)
ERYTHROCYTE [DISTWIDTH] IN BLOOD BY AUTOMATED COUNT: 13.6 % (ref 11.8–14.4)
GFR, ESTIMATED: 69 ML/MIN/1.73M2
GLUCOSE SERPL-MCNC: 195 MG/DL (ref 74–99)
HCT VFR BLD AUTO: 44 % (ref 36.3–47.1)
HGB BLD-MCNC: 14.3 G/DL (ref 11.9–15.1)
IMM GRANULOCYTES # BLD AUTO: 0.05 K/UL (ref 0–0.3)
IMM GRANULOCYTES NFR BLD: 1 %
LYMPHOCYTES NFR BLD: 2.07 K/UL (ref 1.1–3.7)
LYMPHOCYTES RELATIVE PERCENT: 25 % (ref 24–43)
MCH RBC QN AUTO: 29.6 PG (ref 25.2–33.5)
MCHC RBC AUTO-ENTMCNC: 32.5 G/DL (ref 28.4–34.8)
MCV RBC AUTO: 91.1 FL (ref 82.6–102.9)
MONOCYTES NFR BLD: 0.5 K/UL (ref 0.1–1.2)
MONOCYTES NFR BLD: 6 % (ref 3–12)
NEUTROPHILS NFR BLD: 65 % (ref 36–65)
NEUTS SEG NFR BLD: 5.57 K/UL (ref 1.5–8.1)
NRBC BLD-RTO: 0 PER 100 WBC
PLATELET # BLD AUTO: 320 K/UL (ref 138–453)
PMV BLD AUTO: 10.9 FL (ref 8.1–13.5)
POTASSIUM SERPL-SCNC: 3.6 MMOL/L (ref 3.7–5.3)
RBC # BLD AUTO: 4.83 M/UL (ref 3.95–5.11)
SODIUM SERPL-SCNC: 140 MMOL/L (ref 136–145)
WBC OTHER # BLD: 8.4 K/UL (ref 3.5–11.3)

## 2024-10-01 PROCEDURE — 80048 BASIC METABOLIC PNL TOTAL CA: CPT

## 2024-10-01 PROCEDURE — 93005 ELECTROCARDIOGRAM TRACING: CPT

## 2024-10-01 PROCEDURE — 36415 COLL VENOUS BLD VENIPUNCTURE: CPT

## 2024-10-01 PROCEDURE — 85025 COMPLETE CBC W/AUTO DIFF WBC: CPT

## 2024-10-01 NOTE — DISCHARGE INSTRUCTIONS
Preoperative Instructions:    Stop eating solid foods at midnight the night prior to your surgery.     Stop drinking clear liquids at midnight the night prior to your surgery. DO NOT VAPE the morning of surgery.     Arrive at the surgery center (3rd entrance) on _____[id number]__________ by _____0830am __________.     Please stop any blood thinning medications as directed by your surgeon or prescribing physician. Failure to stop certain medications may interfere with your scheduled surgery. These may include: Aspirin, Coumadin, Plavix, NSAIDS (Motrin, Aleve, Advil, Mobic, Celebrex), Eliquis, Pradaxa, Xarelto, Fish oil, and herbal supplements.     You may continue the rest of your medications through the night before surgery unless instructed otherwise.     Day of surgery please take only the following medication(s) with a small sip of water: None      Please  shower the day before and the morning the day of surgery. With antibacterial or CHG soap.       Reminders:  -If you are going home the day of your procedure, you will need a family member or friend to stay during the procedure and drive you home after your procedure. Your  must be 18 years of age or older and able to sign off on your discharge instructions.    -If you are going home the same day of your surgery, someone must remain with you for the first 24 hours after your surgery if you receive sedation or anesthesia.     -Please do not wear any jewelery , lotions, or body piercing the day of surgery

## 2024-10-02 ENCOUNTER — ANESTHESIA EVENT (OUTPATIENT)
Dept: OPERATING ROOM | Age: 49
End: 2024-10-02
Payer: MEDICAID

## 2024-10-02 LAB
EKG ATRIAL RATE: 68 BPM
EKG P AXIS: 53 DEGREES
EKG P-R INTERVAL: 156 MS
EKG Q-T INTERVAL: 406 MS
EKG QRS DURATION: 74 MS
EKG QTC CALCULATION (BAZETT): 431 MS
EKG R AXIS: 48 DEGREES
EKG T AXIS: -5 DEGREES
EKG VENTRICULAR RATE: 68 BPM

## 2024-10-02 NOTE — PROGRESS NOTES
Medical clearance requested for 10-7-24 case per Anesthesiologist after his review. Notified surgeons office to review ekg and medical clearance requested. Faxed labs and EKG to pcp office for review. Spoke with pt and encouraged her to follow up- contact pcp office regarding medical clearance needs for this case. Pt agreed.

## 2024-10-06 NOTE — DISCHARGE INSTRUCTIONS
pat dry with a towel.   Secure the catheter with a clean gauze bandage and tape.      Changing the Catheter:   Get the Things You Will Need:   Drainage bag     * Sterile gloves   Syringe to remove water in the catheter balloon  * Clean sterile gauze bandages  Surgical tape     * Plastic trash bag   Wash your hands with soap and water before and after changing the catheter.   Put on clean gloves.   Take off the old bandage or dressing and throw it  in the trash bag.   Clean the skin at the site.   Remove and throw away your gloves.   Open packages carefully before putting on sterile gloves so you do not infect yourself once gloved.  Be careful not to contaminate the sterile items inside the packages, mainly the new catheter.  It is very helpful to have someone help you.   Put on sterile gloves and take care to keep things sterile at all times when working with the new catheter.   Fix the new catheter as you have been trained.   Using the syringe, remove water from the old catheter bulb.   Keep your fingers close to the site.  Gently pull the catheter until it comes out.  You can tell how far the new catheter should go in from the length of the catheter you took out.   Clean the skin at the site again.            15    Make sure the drainage bag is attached to the new catheter.   Gently put the new catheter in as far as the old one had been.   Once urine begins to flow, inflate the bulb with about 8-10 mL water or the amount given on the package.  If you have pain or feel resistance, withdraw the catheter a little and try again.  If you cannot get the catheter in, cover the opening, and call your caregiver right away.   Secure the catheter with a new bandage.  Tubing should be loose, so it does not pull on the catheter.   Throw away your gloves and any old catheter supplies.      What Problems Could Happen?   Infection    * Bleeding   Kidney damage   * Bladder injury   Bladder stones   * Catheter gets displaced

## 2024-10-06 NOTE — DISCHARGE SUMMARY
Urogynecology Discharge Summary  [facility]      Patient Name: Nata Myles  Patient : 1975  Primary Care Physician: Frank Alvarado MD  Admit Date: 10/7/2024    Principal Diagnosis: Mixed Urinary Incontinence     Other Diagnosis:   Urinary incontinence, mixed [N39.46]  Patient Active Problem List   Diagnosis    Vaginal discharge    Cervical polyp    Yeast infection    Mixed incontinence urge and stress    S/p Hscope, D&C, Cervical Polypectomy 24    Abnormal uterine bleeding due to endometrial polyp    Anxiety and depression     Infection: No  Hospital Acquired: No    Surgical Operations & Procedures: Mixed Urinary Incontinence     Consultations: Anesthesia    Pertinent Findings & Procedures:   Nata Myles is a 49 y.o. female , admitted for Mixed Urinary Incontinence surgical management; received 3g Ancef preoperatively.  She underwent  Lynx sling insertion with cystoscopy on 10/7/24. Post-operatively, patient voided 125 mL with PVR 35 mL . She was discharged home without a salcedo catheter. Post-op course normal, discharged home on POD# 0.  Follow up in 1 week. Discharge instructions reviewed and questions answered.    Course of patient: normal    Discharge to: Home    Readmission planned: No    Recommendations on Discharge:     Medications:     Medication List        START taking these medications      cephALEXin 500 MG capsule  Commonly known as: KEFLEX  Take 1 capsule by mouth 2 times daily for 5 days Take for 5 days if discharged without salcedo catheter. Take for full 7 days if discharged with salcedo catheter     ondansetron 4 MG tablet  Commonly known as: ZOFRAN  Take 1 tablet by mouth every 4 hours as needed for Nausea or Vomiting     oxyCODONE 5 MG immediate release tablet  Commonly known as: Roxicodone  Take 1 tablet by mouth every 6 hours as needed for Pain for up to 3 days. Intended supply: 3 days. Take lowest dose possible to manage pain Max Daily Amount: 20 mg

## 2024-10-06 NOTE — H&P
fL Final    NRBC Automated 10/01/2024 0.0  0.0 per 100 WBC Final    Neutrophils % 10/01/2024 65  36 - 65 % Final    Lymphocytes % 10/01/2024 25  24 - 43 % Final    Monocytes % 10/01/2024 6  3 - 12 % Final    Eosinophils % 10/01/2024 2  1 - 4 % Final    Basophils % 10/01/2024 1  0 - 2 % Final    Immature Granulocytes % 10/01/2024 1 (H)  0 % Final    Neutrophils Absolute 10/01/2024 5.57  1.50 - 8.10 k/uL Final    Lymphocytes Absolute 10/01/2024 2.07  1.10 - 3.70 k/uL Final    Monocytes Absolute 10/01/2024 0.50  0.10 - 1.20 k/uL Final    Eosinophils Absolute 10/01/2024 0.13  0.00 - 0.44 k/uL Final    Basophils Absolute 10/01/2024 0.08  0.00 - 0.20 k/uL Final    Immature Granulocytes Absolute 10/01/2024 0.05  0.00 - 0.30 k/uL Final    Sodium 10/01/2024 140  136 - 145 mmol/L Final    Potassium 10/01/2024 3.6 (L)  3.7 - 5.3 mmol/L Final    SPECIMEN SLIGHTLY HEMOLYZED, RESULTS MAY BE ADVERSELY AFFECTED.    Chloride 10/01/2024 103  98 - 107 mmol/L Final    CO2 10/01/2024 24  20 - 31 mmol/L Final    Anion Gap 10/01/2024 13  9 - 16 mmol/L Final    Glucose 10/01/2024 195 (H)  74 - 99 mg/dL Final    BUN 10/01/2024 11  6 - 20 mg/dL Final    Creatinine 10/01/2024 1.0 (H)  0.50 - 0.90 mg/dL Final    Est, Glom Filt Rate 10/01/2024 69  >60 mL/min/1.73m2 Final    Comment:       These results are not intended for use in patients <18 years of age.        eGFR results are calculated without a race factor using the 2021 CKD-EPI equation.  Careful clinical correlation is recommended, particularly when comparing to results   calculated using previous equations.  The CKD-EPI equation is less accurate in patients with extremes of muscle mass, extra-renal   metabolism of creatine, excessive creatine ingestion, or following therapy that affects   renal tubular secretion.      Calcium 10/01/2024 8.8  8.6 - 10.4 mg/dL Final   Procedure visit on 09/17/2024   Component Date Value Ref Range Status    Glucose, UA POC 09/17/2024 norm  mg/dL Final

## 2024-10-06 NOTE — BRIEF OP NOTE
Brief Operative Note  Department of Obstetrics and Gynecology  [facility]     Patient: Nata Myles   : 1975  MRN: [id number]       Acct: [id number]   Date of Procedure: 10/6/24     Pre-operative Diagnosis:   49 y.o. female    Mixed Urinary Incontinence   BMI 49    Post-operative Diagnosis:   49 y.o. female    Mixed Urinary Incontinence   BMI 49    Procedure: Lynx sling insertion with cystoscopy     Surgeon: Dr. Noel     Assistant(s): Lilly Hogan PGY4; Xochitl Domínguez, PGY3    Anesthesia: general    Findings:  anatomically normal appearing external genitalia without lesions, normal appearing vaginal mucosa without lesions, normal appearing bladder with bilateral ureteral jet peristalsis noted. No evidence of Lynx sling within bladder mucosa  Total IV fluids/Blood products:  none  Urine Output:  voided preop  Estimated blood loss:  5 mL  Drains:  none  Specimens:  none  Instrument and Sponge Count: Correct  Complications:  none  Condition:  good, transferred to post anesthesia recovery    Lilly Hogan MD  Ob/Gyn Resident  10/7/2024, 11:47 AM

## 2024-10-07 ENCOUNTER — HOSPITAL ENCOUNTER (OUTPATIENT)
Age: 49
Setting detail: OUTPATIENT SURGERY
Discharge: HOME | End: 2024-10-07
Attending: OBSTETRICS & GYNECOLOGY | Admitting: OBSTETRICS & GYNECOLOGY
Payer: MEDICAID

## 2024-10-07 ENCOUNTER — ANESTHESIA (OUTPATIENT)
Dept: OPERATING ROOM | Age: 49
End: 2024-10-07
Payer: MEDICAID

## 2024-10-07 VITALS
HEART RATE: 69 BPM | TEMPERATURE: 97.2 F | WEIGHT: 293 LBS | SYSTOLIC BLOOD PRESSURE: 121 MMHG | RESPIRATION RATE: 14 BRPM | OXYGEN SATURATION: 98 % | HEIGHT: 67 IN | BODY MASS INDEX: 45.99 KG/M2 | DIASTOLIC BLOOD PRESSURE: 72 MMHG

## 2024-10-07 DIAGNOSIS — G89.18 POST-OP PAIN: Primary | ICD-10-CM

## 2024-10-07 PROCEDURE — C1771 REP DEV, URINARY, W/SLING: HCPCS | Performed by: OBSTETRICS & GYNECOLOGY

## 2024-10-07 PROCEDURE — 7100000001 HC PACU RECOVERY - ADDTL 15 MIN: Performed by: OBSTETRICS & GYNECOLOGY

## 2024-10-07 PROCEDURE — 3600000014 HC SURGERY LEVEL 4 ADDTL 15MIN: Performed by: OBSTETRICS & GYNECOLOGY

## 2024-10-07 PROCEDURE — 3600000004 HC SURGERY LEVEL 4 BASE: Performed by: OBSTETRICS & GYNECOLOGY

## 2024-10-07 PROCEDURE — 2709999900 HC NON-CHARGEABLE SUPPLY: Performed by: OBSTETRICS & GYNECOLOGY

## 2024-10-07 PROCEDURE — 7100000000 HC PACU RECOVERY - FIRST 15 MIN: Performed by: OBSTETRICS & GYNECOLOGY

## 2024-10-07 PROCEDURE — 6370000000 HC RX 637 (ALT 250 FOR IP)

## 2024-10-07 PROCEDURE — 7100000010 HC PHASE II RECOVERY - FIRST 15 MIN: Performed by: OBSTETRICS & GYNECOLOGY

## 2024-10-07 PROCEDURE — 6360000002 HC RX W HCPCS: Performed by: NURSE ANESTHETIST, CERTIFIED REGISTERED

## 2024-10-07 PROCEDURE — 3700000001 HC ADD 15 MINUTES (ANESTHESIA): Performed by: OBSTETRICS & GYNECOLOGY

## 2024-10-07 PROCEDURE — 2500000003 HC RX 250 WO HCPCS: Performed by: NURSE ANESTHETIST, CERTIFIED REGISTERED

## 2024-10-07 PROCEDURE — 2500000003 HC RX 250 WO HCPCS: Performed by: OBSTETRICS & GYNECOLOGY

## 2024-10-07 PROCEDURE — 7100000011 HC PHASE II RECOVERY - ADDTL 15 MIN: Performed by: OBSTETRICS & GYNECOLOGY

## 2024-10-07 PROCEDURE — 6360000002 HC RX W HCPCS

## 2024-10-07 PROCEDURE — 3700000000 HC ANESTHESIA ATTENDED CARE: Performed by: OBSTETRICS & GYNECOLOGY

## 2024-10-07 PROCEDURE — 2580000003 HC RX 258: Performed by: ANESTHESIOLOGY

## 2024-10-07 DEVICE — SUPRAPUBIC MID-URETHRAL SLING
Type: IMPLANTABLE DEVICE | Site: URETHRA | Status: FUNCTIONAL
Brand: LYNX ULTRA SYSTEM

## 2024-10-07 RX ORDER — LIDOCAINE HYDROCHLORIDE AND EPINEPHRINE 10; 10 MG/ML; UG/ML
INJECTION, SOLUTION INFILTRATION; PERINEURAL
Status: DISCONTINUED
Start: 2024-10-07 | End: 2024-10-07 | Stop reason: HOSPADM

## 2024-10-07 RX ORDER — PROPOFOL 10 MG/ML
INJECTION, EMULSION INTRAVENOUS
Status: DISCONTINUED | OUTPATIENT
Start: 2024-10-07 | End: 2024-10-07 | Stop reason: SDUPTHER

## 2024-10-07 RX ORDER — ACETAMINOPHEN 500 MG
1000 TABLET ORAL EVERY 6 HOURS PRN
Qty: 30 TABLET | Refills: 1 | Status: SHIPPED | OUTPATIENT
Start: 2024-10-07

## 2024-10-07 RX ORDER — SODIUM CHLORIDE 9 MG/ML
INJECTION, SOLUTION INTRAVENOUS PRN
Status: DISCONTINUED | OUTPATIENT
Start: 2024-10-07 | End: 2024-10-07 | Stop reason: HOSPADM

## 2024-10-07 RX ORDER — LIDOCAINE HYDROCHLORIDE 10 MG/ML
INJECTION, SOLUTION EPIDURAL; INFILTRATION; INTRACAUDAL; PERINEURAL PRN
Status: DISCONTINUED | OUTPATIENT
Start: 2024-10-07 | End: 2024-10-07 | Stop reason: ALTCHOICE

## 2024-10-07 RX ORDER — SODIUM CHLORIDE 0.9 % (FLUSH) 0.9 %
5-40 SYRINGE (ML) INJECTION EVERY 12 HOURS SCHEDULED
Status: DISCONTINUED | OUTPATIENT
Start: 2024-10-07 | End: 2024-10-07 | Stop reason: HOSPADM

## 2024-10-07 RX ORDER — IBUPROFEN 600 MG/1
600 TABLET, FILM COATED ORAL EVERY 6 HOURS PRN
Qty: 120 TABLET | Refills: 0 | Status: SHIPPED | OUTPATIENT
Start: 2024-10-07 | End: 2024-11-06

## 2024-10-07 RX ORDER — OXYCODONE HYDROCHLORIDE 5 MG/1
5 TABLET ORAL EVERY 6 HOURS PRN
Qty: 10 TABLET | Refills: 0 | Status: SHIPPED | OUTPATIENT
Start: 2024-10-07 | End: 2024-10-10

## 2024-10-07 RX ORDER — MIDAZOLAM HYDROCHLORIDE 1 MG/ML
INJECTION INTRAMUSCULAR; INTRAVENOUS
Status: DISCONTINUED | OUTPATIENT
Start: 2024-10-07 | End: 2024-10-07 | Stop reason: SDUPTHER

## 2024-10-07 RX ORDER — OXYCODONE HYDROCHLORIDE 5 MG/1
5 TABLET ORAL ONCE
Status: COMPLETED | OUTPATIENT
Start: 2024-10-07 | End: 2024-10-07

## 2024-10-07 RX ORDER — SODIUM CHLORIDE 0.9 % (FLUSH) 0.9 %
5-40 SYRINGE (ML) INJECTION PRN
Status: DISCONTINUED | OUTPATIENT
Start: 2024-10-07 | End: 2024-10-07 | Stop reason: HOSPADM

## 2024-10-07 RX ORDER — SODIUM CHLORIDE, SODIUM LACTATE, POTASSIUM CHLORIDE, CALCIUM CHLORIDE 600; 310; 30; 20 MG/100ML; MG/100ML; MG/100ML; MG/100ML
INJECTION, SOLUTION INTRAVENOUS CONTINUOUS
Status: DISCONTINUED | OUTPATIENT
Start: 2024-10-07 | End: 2024-10-07 | Stop reason: HOSPADM

## 2024-10-07 RX ORDER — LIDOCAINE HYDROCHLORIDE 10 MG/ML
1 INJECTION, SOLUTION EPIDURAL; INFILTRATION; INTRACAUDAL; PERINEURAL
Status: DISCONTINUED | OUTPATIENT
Start: 2024-10-07 | End: 2024-10-07 | Stop reason: HOSPADM

## 2024-10-07 RX ORDER — ONDANSETRON 4 MG/1
4 TABLET, FILM COATED ORAL EVERY 4 HOURS PRN
Qty: 20 TABLET | Refills: 0 | Status: SHIPPED | OUTPATIENT
Start: 2024-10-07 | End: 2024-10-14

## 2024-10-07 RX ORDER — SCOLOPAMINE TRANSDERMAL SYSTEM 1 MG/1
1 PATCH, EXTENDED RELEASE TRANSDERMAL ONCE
Status: DISCONTINUED | OUTPATIENT
Start: 2024-10-07 | End: 2024-10-07 | Stop reason: HOSPADM

## 2024-10-07 RX ORDER — OXYCODONE HYDROCHLORIDE 5 MG/1
TABLET ORAL
Status: COMPLETED
Start: 2024-10-07 | End: 2024-10-07

## 2024-10-07 RX ORDER — AMOXICILLIN 250 MG
2 CAPSULE ORAL
Qty: 60 TABLET | Refills: 1 | Status: SHIPPED | OUTPATIENT
Start: 2024-10-07 | End: 2024-11-06

## 2024-10-07 RX ORDER — ONDANSETRON 2 MG/ML
INJECTION INTRAMUSCULAR; INTRAVENOUS
Status: DISCONTINUED | OUTPATIENT
Start: 2024-10-07 | End: 2024-10-07 | Stop reason: SDUPTHER

## 2024-10-07 RX ORDER — PROCHLORPERAZINE EDISYLATE 5 MG/ML
5 INJECTION INTRAMUSCULAR; INTRAVENOUS
Status: DISCONTINUED | OUTPATIENT
Start: 2024-10-07 | End: 2024-10-07 | Stop reason: HOSPADM

## 2024-10-07 RX ORDER — NALOXONE HYDROCHLORIDE 0.4 MG/ML
INJECTION, SOLUTION INTRAMUSCULAR; INTRAVENOUS; SUBCUTANEOUS PRN
Status: DISCONTINUED | OUTPATIENT
Start: 2024-10-07 | End: 2024-10-07 | Stop reason: HOSPADM

## 2024-10-07 RX ORDER — CEPHALEXIN 500 MG/1
500 CAPSULE ORAL 2 TIMES DAILY
Qty: 10 CAPSULE | Refills: 0 | Status: SHIPPED | OUTPATIENT
Start: 2024-10-07 | End: 2024-10-12

## 2024-10-07 RX ORDER — HYDRALAZINE HYDROCHLORIDE 20 MG/ML
10 INJECTION INTRAMUSCULAR; INTRAVENOUS
Status: DISCONTINUED | OUTPATIENT
Start: 2024-10-07 | End: 2024-10-07 | Stop reason: HOSPADM

## 2024-10-07 RX ORDER — SEVOFLURANE 250 ML/250ML
LIQUID RESPIRATORY (INHALATION)
Status: DISCONTINUED
Start: 2024-10-07 | End: 2024-10-07 | Stop reason: HOSPADM

## 2024-10-07 RX ORDER — LIDOCAINE HYDROCHLORIDE AND EPINEPHRINE 10; 10 MG/ML; UG/ML
INJECTION, SOLUTION INFILTRATION; PERINEURAL PRN
Status: DISCONTINUED | OUTPATIENT
Start: 2024-10-07 | End: 2024-10-07 | Stop reason: ALTCHOICE

## 2024-10-07 RX ORDER — OXYCODONE AND ACETAMINOPHEN 5; 325 MG/1; MG/1
1 TABLET ORAL EVERY 6 HOURS PRN
Qty: 15 TABLET | Refills: 0 | Status: SHIPPED | OUTPATIENT
Start: 2024-10-07 | End: 2024-10-07 | Stop reason: HOSPADM

## 2024-10-07 RX ORDER — DEXAMETHASONE SODIUM PHOSPHATE 10 MG/ML
INJECTION, SOLUTION INTRAMUSCULAR; INTRAVENOUS
Status: DISCONTINUED | OUTPATIENT
Start: 2024-10-07 | End: 2024-10-07 | Stop reason: SDUPTHER

## 2024-10-07 RX ORDER — LIDOCAINE HYDROCHLORIDE 10 MG/ML
INJECTION, SOLUTION EPIDURAL; INFILTRATION; INTRACAUDAL; PERINEURAL
Status: DISCONTINUED | OUTPATIENT
Start: 2024-10-07 | End: 2024-10-07 | Stop reason: SDUPTHER

## 2024-10-07 RX ORDER — FENTANYL CITRATE 50 UG/ML
INJECTION, SOLUTION INTRAMUSCULAR; INTRAVENOUS
Status: DISCONTINUED | OUTPATIENT
Start: 2024-10-07 | End: 2024-10-07 | Stop reason: SDUPTHER

## 2024-10-07 RX ORDER — LABETALOL HYDROCHLORIDE 5 MG/ML
10 INJECTION, SOLUTION INTRAVENOUS
Status: DISCONTINUED | OUTPATIENT
Start: 2024-10-07 | End: 2024-10-07 | Stop reason: HOSPADM

## 2024-10-07 RX ORDER — PHENAZOPYRIDINE HYDROCHLORIDE 100 MG/1
100 TABLET, FILM COATED ORAL ONCE
Status: COMPLETED | OUTPATIENT
Start: 2024-10-07 | End: 2024-10-07

## 2024-10-07 RX ORDER — KETOROLAC TROMETHAMINE 30 MG/ML
INJECTION, SOLUTION INTRAMUSCULAR; INTRAVENOUS
Status: DISCONTINUED | OUTPATIENT
Start: 2024-10-07 | End: 2024-10-07 | Stop reason: SDUPTHER

## 2024-10-07 RX ORDER — LIDOCAINE HYDROCHLORIDE 10 MG/ML
INJECTION, SOLUTION INFILTRATION; PERINEURAL
Status: COMPLETED
Start: 2024-10-07 | End: 2024-10-07

## 2024-10-07 RX ADMIN — PROPOFOL 40 MG: 10 INJECTION, EMULSION INTRAVENOUS at 11:09

## 2024-10-07 RX ADMIN — KETOROLAC TROMETHAMINE 30 MG: 30 INJECTION, SOLUTION INTRAMUSCULAR at 11:40

## 2024-10-07 RX ADMIN — PROPOFOL 200 MG: 10 INJECTION, EMULSION INTRAVENOUS at 11:07

## 2024-10-07 RX ADMIN — DEXAMETHASONE SODIUM PHOSPHATE 10 MG: 10 INJECTION INTRAMUSCULAR; INTRAVENOUS at 11:20

## 2024-10-07 RX ADMIN — SODIUM CHLORIDE, PRESERVATIVE FREE 10 ML: 5 INJECTION INTRAVENOUS at 09:57

## 2024-10-07 RX ADMIN — OXYCODONE HYDROCHLORIDE 5 MG: 5 TABLET ORAL at 12:35

## 2024-10-07 RX ADMIN — PROPOFOL 40 MG: 10 INJECTION, EMULSION INTRAVENOUS at 11:31

## 2024-10-07 RX ADMIN — FENTANYL CITRATE 100 MCG: 50 INJECTION, SOLUTION INTRAMUSCULAR; INTRAVENOUS at 11:07

## 2024-10-07 RX ADMIN — FENTANYL CITRATE 50 MCG: 50 INJECTION, SOLUTION INTRAMUSCULAR; INTRAVENOUS at 11:42

## 2024-10-07 RX ADMIN — PHENAZOPYRIDINE HYDROCHLORIDE 100 MG: 100 TABLET ORAL at 09:49

## 2024-10-07 RX ADMIN — FENTANYL CITRATE 50 MCG: 50 INJECTION, SOLUTION INTRAMUSCULAR; INTRAVENOUS at 11:33

## 2024-10-07 RX ADMIN — MIDAZOLAM 2 MG: 1 INJECTION INTRAMUSCULAR; INTRAVENOUS at 11:02

## 2024-10-07 RX ADMIN — Medication 3000 MG: at 11:19

## 2024-10-07 RX ADMIN — ONDANSETRON 4 MG: 2 INJECTION INTRAMUSCULAR; INTRAVENOUS at 11:42

## 2024-10-07 RX ADMIN — LIDOCAINE HYDROCHLORIDE 50 MG: 10 INJECTION, SOLUTION EPIDURAL; INFILTRATION; INTRACAUDAL; PERINEURAL at 11:07

## 2024-10-07 ASSESSMENT — PAIN - FUNCTIONAL ASSESSMENT
PAIN_FUNCTIONAL_ASSESSMENT: NONE - DENIES PAIN
PAIN_FUNCTIONAL_ASSESSMENT: 0-10

## 2024-10-07 ASSESSMENT — LIFESTYLE VARIABLES: SMOKING_STATUS: 1

## 2024-10-07 ASSESSMENT — PAIN DESCRIPTION - LOCATION: LOCATION: ABDOMEN

## 2024-10-07 ASSESSMENT — PAIN SCALES - GENERAL
PAINLEVEL_OUTOF10: 8
PAINLEVEL_OUTOF10: 8

## 2024-10-07 ASSESSMENT — PAIN DESCRIPTION - DESCRIPTORS: DESCRIPTORS: CRAMPING

## 2024-10-07 NOTE — ANESTHESIA POSTPROCEDURE EVALUATION
Department of Anesthesiology  Postprocedure Note    Patient: Nata Myles  MRN: [id number]  YOB: 1975  Date of evaluation: 10/7/2024    Procedure Summary       Date: 10/07/24 Room / Location: [address]    Anesthesia Start: 1104 Anesthesia Stop: 1155    Procedure: CYSTOSCOPY AND LYNX SLING INSERTION Diagnosis:       Urinary incontinence, mixed      (Urinary incontinence, mixed [N39.46])    Surgeons: Ovi Noel DO Responsible Provider: Vega Hammer MD    Anesthesia Type: general ASA Status: 3            Anesthesia Type: No value filed.    Rush Phase I: Rush Score: 10    Rush Phase II: Rush Score: 10    Anesthesia Post Evaluation    Patient location during evaluation: PACU  Patient participation: complete - patient participated  Level of consciousness: awake and awake and alert  Pain score: 4  Nausea & Vomiting: no nausea and no vomiting  Cardiovascular status: hemodynamically stable and blood pressure returned to baseline  Respiratory status: acceptable  Hydration status: euvolemic  Multimodal analgesia pain management approach  Pain management: adequate    No notable events documented.

## 2024-10-07 NOTE — OP NOTE
Operative Note      Patient: Nata Myles  YOB: 1975  MRN: [id number]    Date of Procedure: 10/7/2024    Pre-Op Diagnosis Codes:      * Urinary incontinence, mixed [N39.46]  Stress Urinary Incontinence  Post-Op Diagnosis: Same       Procedure(s):  CYSTOSCOPY AND LYNX SLING INSERTION    Surgeon(s):  Ovi Noel DO    Assistant:   Resident: Lilly Hogan MD; Xochitl Domínguez DO    Anesthesia: General    Estimated Blood Loss (mL): Minimal    Complications: None    Specimens:   * No specimens in log *    Implants:  Implant Name Type Inv. Item Serial No.  Lot No. LRB No. Used Action   SLING UROLOGICAL MID-URETHRAL SUPRAPUBIC 2 DEL LYNX ULTRA - [id number]  SLING UROLOGICAL MID-URETHRAL SUPRAPUBIC 2 DEL LYNX ULTRA  [facility]- [id number] N/A 1 Implanted         Drains: * No LDAs found *    Findings:  Infection Present At Time Of Surgery (PATOS) (choose all levels that have infection present):  No infection present  Other Findings: Implant: Lynx Sling    Detailed Description of Procedure:   The indication for an incontinence sling is symptomatic stress predominant urinary incontinence based on symptomatology and clinical / laboratory findings.     The patient is being admitted for a planned elective surgical procedure today. The patient has symptoms, physical findings, and diagnostic testing meeting appropriate indications for today's planned procedure. The patient has been educated about their condition, conservative and surgical options have been offered to the patient, and the patient has decided to proceed with a surgical option. In the preoperative holding area prior to the procedure, the operative plan, including risks, benefits and alternatives was reviewed with the patient and any present family member and friend. The patient was specifically informed of the risks, benefits, and alternatives to sling placement including FDA information & the [place] safety profile regarding

## 2024-10-07 NOTE — ANESTHESIA PRE PROCEDURE
Department of Anesthesiology  Preprocedure Note       Name:  Nata Myles   Age:  49 y.o.  :  1975                                          MRN:  [id number]         Date:  10/7/2024      Surgeon: Surgeon(s):  Ovi Noel DO    Procedure: Procedure(s):  CYSTOSCOPY VAGINAL SLING    Medications prior to admission:   Prior to Admission medications    Medication Sig Start Date End Date Taking? Authorizing Provider   ibuprofen (ADVIL;MOTRIN) 600 MG tablet Take 1 tablet by mouth every 6 hours as needed for Pain 10/7/24 11/6/24 Yes Lilly Hogan MD   ondansetron (ZOFRAN) 4 MG tablet Take 1 tablet by mouth every 4 hours as needed for Nausea or Vomiting 10/7/24 10/14/24 Yes Lilly Hogan MD   senna-docusate (SENOKOT S) 8.6-50 MG per tablet Take 2 tablets by mouth nightly 10/7/24 11/6/24 Yes Lilly Hogan MD   cephALEXin (KEFLEX) 500 MG capsule Take 1 capsule by mouth 2 times daily for 5 days Take for 5 days if discharged without salcedo catheter. Take for full 7 days if discharged with salcedo catheter 10/7/24 10/12/24 Yes Lilly Hogan MD   oxyCODONE (ROXICODONE) 5 MG immediate release tablet Take 1 tablet by mouth every 6 hours as needed for Pain for up to 3 days. Intended supply: 3 days. Take lowest dose possible to manage pain Max Daily Amount: 20 mg 10/7/24 10/10/24 Yes Xochitl Domínguez DO   acetaminophen (TYLENOL) 500 MG tablet Take 2 tablets by mouth every 6 hours as needed for Pain 10/7/24  Yes Xochitl Domínguez DO   atenolol-chlorthalidone (TENORETIC) 50-25 MG per tablet Take 1 tablet by mouth daily 3/7/24  Yes Ector Kong MD   busPIRone (BUSPAR) 10 MG tablet Take 1 tablet by mouth 3 times daily   Yes Ector Kong MD   estrogens conjugated (PREMARIN) 0.625 MG/GM CREA vaginal cream Place 0.5 g vaginally Twice a Week 5/14/24 10/7/24 Yes Ector Kong MD   venlafaxine (EFFEXOR XR) 75 MG extended release capsule Take 1 capsule by mouth daily   Yes Provider, MD Ector

## 2024-10-09 ENCOUNTER — TELEPHONE (OUTPATIENT)
Age: 49
End: 2024-10-09

## 2024-10-09 NOTE — TELEPHONE ENCOUNTER
POST OP CALL RECORD      Date:  10/9/2024   Time:  2:38 PM   Patient:  Nata Myles   :  1975   Procedure:   CYSTOSCOPY AND LYNX SLING INSERTION     Procedure Date: 10/7/2024     How are you feeling?  Good having a little pain around the pubic bone   Are you voiding ok?  Yes  Is your Catheter draining?   N/a   Passing Gas?  Yes  Last BM?  Since surgery  If no BM, what have you done to encourage?  Stool Softeners, Increased fluids, Prunes and Plumsmart Juice, Laxatives, Milk of Magnesia, Suppositories, and Enema  Any Nausea/Vomiting/Diarrhea?  No    Diet: minimal   Fever:  No  Pain: Yes, 6  What pain medication are you taking? None   Post-Op visit Scheduled for follow up? 10/15/2024     Comments?  Patient is doing well. Patient c/o pain around her pubic bone but other than that feeling great. Patient was not taking anything for pain so writer encouraged patient to start Ibuprofen up until her first p/o to help with swelling, patient v/u. Patient is passing gas but has not had a bowel movement since surgery. Writer went over things to help initiate a BM, patient v/u. Informed patient of post op appt date and time and how to reach the on call Dr. Patient v/u     Noted. Electronically signed by Ovi Noel DO on 10/10/2024 at 7:40 AM

## 2024-10-15 ENCOUNTER — OFFICE VISIT (OUTPATIENT)
Age: 49
End: 2024-10-15
Payer: MEDICAID

## 2024-10-15 VITALS
SYSTOLIC BLOOD PRESSURE: 136 MMHG | DIASTOLIC BLOOD PRESSURE: 67 MMHG | OXYGEN SATURATION: 95 % | WEIGHT: 293 LBS | BODY MASS INDEX: 49.18 KG/M2 | TEMPERATURE: 98.1 F | HEART RATE: 77 BPM

## 2024-10-15 DIAGNOSIS — R33.9 RETENTION OF URINE: ICD-10-CM

## 2024-10-15 DIAGNOSIS — Z98.890 POST-OPERATIVE STATE: Primary | ICD-10-CM

## 2024-10-15 LAB
BILIRUBIN, POC: NORMAL
BLOOD URINE, POC: 50
CLARITY, POC: NORMAL
COLOR, POC: NORMAL
GLUCOSE URINE, POC: NORMAL MG/DL
KETONES, POC: NORMAL MG/DL
LEUKOCYTE EST, POC: 75
NITRITE, POC: NORMAL
PH, POC: 5
PROTEIN, POC: NORMAL MG/DL
SPECIFIC GRAVITY, POC: 1.02
UROBILINOGEN, POC: NORMAL MG/DL

## 2024-10-15 PROCEDURE — 51798 US URINE CAPACITY MEASURE: CPT

## 2024-10-15 PROCEDURE — 99024 POSTOP FOLLOW-UP VISIT: CPT

## 2024-10-15 PROCEDURE — 81003 URINALYSIS AUTO W/O SCOPE: CPT

## 2024-10-15 NOTE — PROGRESS NOTES
One week post-op    Surgery Type:   CYSTOSCOPY AND LYNX SLING INSERTION     Surgery Date: 10/07/2024    Date urinary cathter was pulled\" n/a  Voiding w/o difficulty:yes    Level of pain: 0  Pain medication;   Ibuprofen:  n/a  Tylenol:  n/a  Norco:not taking, date of last dose: 10/08/2024  Percocet:  n/a , date of last dose: n/a    Stool softener: not taking  Last bowel movement: 10/15/2024  Bowel movement description: soft and formed    Antibiotics given: yes  Antibiotics completed: yes    Did patient use:   Simethicone no  Flomax no    Vaginal bleeding: no  Discharge: no   
dysfunction in eliminating urine or feces.     Preventative care: Disclaimer: - This note is created with the assistance of a speech recognition program.  While intending to generate a timely document that accurately reflects the content of the visit, there is no guarantee every grammatical, syntax, or spelling error has been or will be identified or corrected.  Additionally, system limitations of the EMR beyond the control of the practitioner may cause unintentional errors or omissions not identified or corrected at the time of record finalization and signature.    Multiple records reviewed. All questions were addressed to the patient's satisfaction.    Follow up: Return for Next scheduled appointment.     Electronically signed by TIMOTHY Carroll CNP on 10/15/2024 at 9:35 AM

## 2025-01-08 NOTE — ED NOTES
Report given to Rasheeda Richards RN. All questions answered.       Jackson Chaudhari RN  07/10/21 [id number] DISPLAY PLAN FREE TEXT

## 2025-02-27 ENCOUNTER — OFFICE VISIT (OUTPATIENT)
Dept: PULMONOLOGY | Age: 50
End: 2025-02-27
Payer: MEDICAID

## 2025-02-27 VITALS
WEIGHT: 293 LBS | HEART RATE: 71 BPM | RESPIRATION RATE: 16 BRPM | OXYGEN SATURATION: 99 % | SYSTOLIC BLOOD PRESSURE: 120 MMHG | BODY MASS INDEX: 45.99 KG/M2 | HEIGHT: 67 IN | DIASTOLIC BLOOD PRESSURE: 81 MMHG

## 2025-02-27 DIAGNOSIS — R06.83 SNORING: Primary | ICD-10-CM

## 2025-02-27 DIAGNOSIS — G47.33 OSA (OBSTRUCTIVE SLEEP APNEA): ICD-10-CM

## 2025-02-27 DIAGNOSIS — F45.8 BRUXISM: ICD-10-CM

## 2025-02-27 DIAGNOSIS — E66.01 MORBID OBESITY DUE TO EXCESS CALORIES: ICD-10-CM

## 2025-02-27 DIAGNOSIS — I10 ESSENTIAL HYPERTENSION: ICD-10-CM

## 2025-02-27 PROCEDURE — 3074F SYST BP LT 130 MM HG: CPT | Performed by: INTERNAL MEDICINE

## 2025-02-27 PROCEDURE — 99203 OFFICE O/P NEW LOW 30 MIN: CPT | Performed by: INTERNAL MEDICINE

## 2025-02-27 PROCEDURE — 3079F DIAST BP 80-89 MM HG: CPT | Performed by: INTERNAL MEDICINE

## 2025-02-27 RX ORDER — BETAMETHASONE SODIUM PHOSPHATE AND BETAMETHASONE ACETATE 3; 3 MG/ML; MG/ML
1 INJECTION, SUSPENSION INTRA-ARTICULAR; INTRALESIONAL; INTRAMUSCULAR; SOFT TISSUE ONCE
COMMUNITY

## 2025-02-27 NOTE — PROGRESS NOTES
[facility], [facility].  [address] [place] [address]  [address]  [facility] [address]  Dept: [phone]  Dept Fax: [phone]      2/27/25    Patient: Nata Myles  YOB: 1975    Dear Frank Alvarado MD,    I had the pleasure of seeing one of your patients, NATA MYLES today in the office today.  Please find attached my note with the assessment and plan of care.  Thank you for allowing me to participate in the care of this patient.  I will keep you updated on this patient's follow up and I look forward to serving you and your patients again in the future.    Harpreet Cortez MD  2/27/2025 1:09 PM

## 2025-02-27 NOTE — PATIENT INSTRUCTIONS
[facility]        YOU ARE BEING REFERRED TO:    [facility] (a department of [facility])    [address] [place] [address] [address]  [place] [address]    Main Phone Number: [phone]    Phone number for scheduling sleep study: [phone]      Please contact the above numbers to schedule your sleep study.     Once you have completed the FIRST NIGHT you may be asked to return for a SECOND NIGHT if necessary.   After the SECOND NIGHT the sleep center will order a CPAP/BiPAP machine for you.     An order for the machine will be sent to a durable medical equipment company ([employer]).   The sleep center will provide you with the [employer] companies information.     Once you have your machine please contact our office to schedule a follow up appointment.   Your follow up will be scheduled for a date 30-90 days after you have received your machine.   At that follow up visit we will need to have a compliance download from your DME company.   Please contact your [employer] company to have the compliance download faxed to our office at   [phone] for your follow up appointment.       IF YOU HAVE ANY QUESTIONS ABOUT YOUR SLEEP STUDY   PLEASE CONTACT THE SLEEP CENTER.
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
## 2025-03-18 ENCOUNTER — TELEPHONE (OUTPATIENT)
Dept: PULMONOLOGY | Age: 50
End: 2025-03-18

## 2025-03-18 DIAGNOSIS — G47.33 OSA (OBSTRUCTIVE SLEEP APNEA): Primary | ICD-10-CM

## 2025-03-18 NOTE — TELEPHONE ENCOUNTER
Message    I believe I ordered home sleep study last night.  Please look into it.  Thank you   ----- Message -----   From: Kimmy Clements   Sent: 3/18/2025  10:57 AM EDT   To: Harpreet Cortez MD; Keyona Acuna MA   Subject: Import                                            Imported by Kimmy Clements on 3/18/2025 at 10:57 AM to the following: Clinical Documentation Only on 3/18/2025     Media Information                      Notice:       Document on 3/18/2025 10:57 AM by Kimmy Clements: 3/18/25 Sleep Study Denial                   Description       3/18/25 Sleep Study Denial              Patient       Nata Myles              Document Type 1       Sleep Lab            *Faxed home sleep study order to [facility] sleep center at [phone].*

## 2025-04-01 ENCOUNTER — HOSPITAL ENCOUNTER (OUTPATIENT)
Dept: SLEEP CENTER | Age: 50
Discharge: HOME | End: 2025-04-03
Attending: INTERNAL MEDICINE
Payer: MEDICAID

## 2025-04-01 DIAGNOSIS — G47.33 OSA (OBSTRUCTIVE SLEEP APNEA): ICD-10-CM

## 2025-04-01 PROCEDURE — G0399 HOME SLEEP TEST/TYPE 3 PORTA: HCPCS

## (undated) DEVICE — Device

## (undated) DEVICE — SUTURE VICRYL SZ 2-0 L27IN ABSRB UD L26MM CT-2 1/2 CIR J269H

## (undated) DEVICE — STRAP RESTRAIN W3.5XL19IN TECLIN STRRP POS LEG DURING LITH

## (undated) DEVICE — NEPTUNE E-SEP SMOKE EVACUATION PENCIL, COATED, 70MM BLADE, PUSH BUTTON SWITCH: Brand: NEPTUNE E-SEP

## (undated) DEVICE — COVER OR TBL W40XL90IN ABSRB STD AND GRIPPY BK SAHARA

## (undated) DEVICE — SYSTEM WST MGMT AVETA

## (undated) DEVICE — SOLUTION SCRB 4OZ 2% CHG FOR SURG SCRBBING HND WSH

## (undated) DEVICE — SOLUTION IV 1000ML 0.9% SOD CHL PH 5 INJ USP VIAFLX PLAS

## (undated) DEVICE — 1016 S-DRAPE IRRIG POUCH 10/BOX: Brand: STERI-DRAPE™

## (undated) DEVICE — Y-TYPE TUR/BLADDER IRRIGATION SET, REGULATING CLAMP

## (undated) DEVICE — PAD,SANITARY,11 IN,MAXI,W/WINGS,N-STRL: Brand: MEDLINE

## (undated) DEVICE — NEEDLE,22GX1.5",REG,BEVEL: Brand: MEDLINE

## (undated) DEVICE — GARMENT,MEDLINE,DVT,INT,CALF,MED, GEN2: Brand: MEDLINE

## (undated) DEVICE — STRAP ARMBRD W1.5XL32IN FOAM STR YET SFT W/ HK AND LOOP

## (undated) DEVICE — STRAP,POSITIONING,KNEE/BODY,FOAM,4X60": Brand: MEDLINE

## (undated) DEVICE — ELECTRODE PT RET AD L9FT HI MOIST COND ADH HYDRGEL CORDED

## (undated) DEVICE — SYRINGE, LUER LOCK, 10ML: Brand: MEDLINE

## (undated) DEVICE — COUNTER NDL 10 COUNT HLD 20 FOAM BLK SGL MAG

## (undated) DEVICE — MHPB GYN MINOR PACK: Brand: MEDLINE INDUSTRIES, INC.

## (undated) DEVICE — LIQUIBAND RAPID ADHESIVE 36/CS 0.8ML: Brand: MEDLINE

## (undated) DEVICE — INTENDED FOR TISSUE SEPARATION, AND OTHER PROCEDURES THAT REQUIRE A SHARP SURGICAL BLADE TO PUNCTURE OR CUT.: Brand: BARD-PARKER ® CARBON RIB-BACK BLADES

## (undated) DEVICE — CATHETER URETH 18FR BLLN 5CC SIL ALLY W/ SIL HYDRGEL 2 W F

## (undated) DEVICE — HYSTEROSCOPE RIGID CORAL AVETA DISP

## (undated) DEVICE — UNDERPANTS MAT L/XL KNIT SEAMLESS CLR CODE WAISTBAND

## (undated) DEVICE — GLOVE SURG SZ 65 THK91MIL LTX FREE SYN POLYISOPRENE

## (undated) DEVICE — GLOVE SURG SZ 6 THK91MIL LTX FREE SYN POLYISOPRENE ANTI

## (undated) DEVICE — GLOVE ORANGE PI 7 1/2   MSG9075

## (undated) DEVICE — SYRINGE IRRIG 60ML SFT PLIABLE BLB EZ TO GRP 1 HND USE W/

## (undated) DEVICE — TUBING, SUCTION, 9/32" X 20', STRAIGHT: Brand: MEDLINE INDUSTRIES, INC.

## (undated) DEVICE — SVMMC GYN MIN PK

## (undated) DEVICE — SHEET,DRAPE,70X100,STERILE: Brand: MEDLINE

## (undated) DEVICE — BLADE CLIPPER SURG SENSICLIP

## (undated) DEVICE — GLOVE SURG SZ 65 L12IN THK75MIL DK GRN LTX FREE

## (undated) DEVICE — SYRINGE MED 30ML STD CLR PLAS LUERLOCK TIP N CTRL DISP

## (undated) DEVICE — SYSTEM WST MGMT W/ CAP AVETA